# Patient Record
Sex: FEMALE | Race: BLACK OR AFRICAN AMERICAN | Employment: UNEMPLOYED | URBAN - METROPOLITAN AREA
[De-identification: names, ages, dates, MRNs, and addresses within clinical notes are randomized per-mention and may not be internally consistent; named-entity substitution may affect disease eponyms.]

---

## 2017-04-02 ENCOUNTER — HOSPITAL ENCOUNTER (EMERGENCY)
Age: 27
Discharge: STILL ADMITTED OR ADMINISTRATIVE | End: 2017-04-02
Attending: EMERGENCY MEDICINE
Payer: MEDICAID

## 2017-04-02 PROCEDURE — 75810000275 HC EMERGENCY DEPT VISIT NO LEVEL OF CARE

## 2017-04-10 ENCOUNTER — HOSPITAL ENCOUNTER (EMERGENCY)
Age: 27
Discharge: HOME | End: 2017-04-10
Attending: OBSTETRICS & GYNECOLOGY | Admitting: OBSTETRICS & GYNECOLOGY
Payer: MEDICAID

## 2017-04-10 VITALS
BODY MASS INDEX: 22.18 KG/M2 | DIASTOLIC BLOOD PRESSURE: 60 MMHG | TEMPERATURE: 98.5 F | RESPIRATION RATE: 18 BRPM | HEART RATE: 96 BPM | HEIGHT: 66 IN | WEIGHT: 138 LBS | SYSTOLIC BLOOD PRESSURE: 112 MMHG

## 2017-04-10 LAB
SERVICE CMNT-IMP: NORMAL
WET PREP GENITAL: NORMAL

## 2017-04-10 PROCEDURE — 74011250636 HC RX REV CODE- 250/636

## 2017-04-10 PROCEDURE — 59025 FETAL NON-STRESS TEST: CPT

## 2017-04-10 PROCEDURE — 96367 TX/PROPH/DG ADDL SEQ IV INF: CPT

## 2017-04-10 PROCEDURE — 87081 CULTURE SCREEN ONLY: CPT | Performed by: OBSTETRICS & GYNECOLOGY

## 2017-04-10 PROCEDURE — 87210 SMEAR WET MOUNT SALINE/INK: CPT | Performed by: OBSTETRICS & GYNECOLOGY

## 2017-04-10 PROCEDURE — 87491 CHLMYD TRACH DNA AMP PROBE: CPT | Performed by: OBSTETRICS & GYNECOLOGY

## 2017-04-10 PROCEDURE — 77030034850

## 2017-04-10 PROCEDURE — 96360 HYDRATION IV INFUSION INIT: CPT

## 2017-04-10 PROCEDURE — 96365 THER/PROPH/DIAG IV INF INIT: CPT

## 2017-04-10 PROCEDURE — 74011000258 HC RX REV CODE- 258: Performed by: OBSTETRICS & GYNECOLOGY

## 2017-04-10 PROCEDURE — 74011250636 HC RX REV CODE- 250/636: Performed by: OBSTETRICS & GYNECOLOGY

## 2017-04-10 PROCEDURE — 99285 EMERGENCY DEPT VISIT HI MDM: CPT

## 2017-04-10 PROCEDURE — 96372 THER/PROPH/DIAG INJ SC/IM: CPT

## 2017-04-10 RX ORDER — SODIUM CHLORIDE 0.9 % (FLUSH) 0.9 %
5-10 SYRINGE (ML) INJECTION AS NEEDED
Status: CANCELLED | OUTPATIENT
Start: 2017-04-10

## 2017-04-10 RX ORDER — SODIUM CHLORIDE 0.9 % (FLUSH) 0.9 %
5-10 SYRINGE (ML) INJECTION EVERY 8 HOURS
Status: CANCELLED | OUTPATIENT
Start: 2017-04-10

## 2017-04-10 RX ORDER — BETAMETHASONE SODIUM PHOSPHATE AND BETAMETHASONE ACETATE 3; 3 MG/ML; MG/ML
12 INJECTION, SUSPENSION INTRA-ARTICULAR; INTRALESIONAL; INTRAMUSCULAR; SOFT TISSUE EVERY 24 HOURS
Status: DISCONTINUED | OUTPATIENT
Start: 2017-04-10 | End: 2017-04-11 | Stop reason: HOSPADM

## 2017-04-10 RX ORDER — MAGNESIUM SULFATE HEPTAHYDRATE 40 MG/ML
4 INJECTION, SOLUTION INTRAVENOUS ONCE
Status: DISCONTINUED | OUTPATIENT
Start: 2017-04-10 | End: 2017-04-11 | Stop reason: HOSPADM

## 2017-04-10 RX ORDER — MAGNESIUM SULFATE HEPTAHYDRATE 40 MG/ML
INJECTION, SOLUTION INTRAVENOUS
Status: COMPLETED
Start: 2017-04-10 | End: 2017-04-10

## 2017-04-10 RX ORDER — SODIUM CHLORIDE, SODIUM LACTATE, POTASSIUM CHLORIDE, CALCIUM CHLORIDE 600; 310; 30; 20 MG/100ML; MG/100ML; MG/100ML; MG/100ML
125 INJECTION, SOLUTION INTRAVENOUS CONTINUOUS
Status: DISCONTINUED | OUTPATIENT
Start: 2017-04-10 | End: 2017-04-11 | Stop reason: HOSPADM

## 2017-04-10 RX ADMIN — MAGNESIUM SULFATE HEPTAHYDRATE 4 G: 40 INJECTION, SOLUTION INTRAVENOUS at 21:28

## 2017-04-10 RX ADMIN — BETAMETHASONE SODIUM PHOSPHATE AND BETAMETHASONE ACETATE 12 MG: 3; 3 INJECTION, SUSPENSION INTRA-ARTICULAR; INTRALESIONAL; INTRAMUSCULAR at 21:43

## 2017-04-10 RX ADMIN — Medication 2 G/HR: at 21:54

## 2017-04-10 RX ADMIN — SODIUM CHLORIDE 5 MILLION UNITS: 900 INJECTION INTRAVENOUS at 21:43

## 2017-04-11 LAB
C TRACH RRNA SPEC QL NAA+PROBE: NEGATIVE
N GONORRHOEA RRNA SPEC QL NAA+PROBE: NEGATIVE
SPECIMEN SOURCE: NORMAL

## 2017-04-11 NOTE — PROGRESS NOTES
2211:  Patient being picked up by [facility],[address] transport for [facility].  Call placed to L & D charge nurse, Receiving RN will return call. 2238:   Patience Baxter RN from L&D @ [facility] returned call. Report given on patients status, VS, Labs, HX.

## 2017-04-11 NOTE — PROGRESS NOTES
- Dr. Luci Lance at bedside, speculum and SVE, 2 cm, bedside ultrasound, triage assessment performed    - Patient presents to labor and delivery from the emergency room  25weeks 2days with twin gestation, complains of spotting and cramping    - Medics on unit to transfer to [facility]

## 2017-04-11 NOTE — H&P
History & Physical    Name: Emily Stark MRN: [id number]  SSN: [id number]    YOB: 1990  Age: 23 y.o. Sex: female        Subjective:     Estimated Date of Delivery: None noted. OB History      Para Term  AB TAB SAB Ectopic Multiple Living    6 3 1 2 2 1 1  0 3          Ms. Neno Hardin presents for evaluation of pregnancy at 25.2 for vaginal bleeding. Notes that she woke this afternoon and noted bright red bleeding, associated with cramping, notes tightening in her abdomen as well. Bled like the first day of her period. Twin pregnancy, denies any history of previa, was not told she cannot have sex. + FM, no ctxs, pain is more sharp, 8/10, better than previous, pains every 20 minutes, lasts a few seconds. No STIs this pregnancy. Prenatal course was complicated by twins. Please see prenatal records for details. History reviewed. No pertinent past medical history. History reviewed. No pertinent surgical history. No Known Allergies  Prior to Admission medications    Medication Sig Start Date End Date Taking? Authorizing Provider   Aspirin        ibuprofen (MOTRIN) 800 mg tablet           Social History     Occupational History    Not on file. Social History Main Topics    Smoking status: Current Every Day Smoker     Packs/day: 0.25    Smokeless tobacco: Not on file    Alcohol use No    Drug use: No    Sexual activity: Yes     Partners: Male     Family History   Problem Relation Age of Onset    Ovarian Cancer Mother     Other Maternal Grandmother      brain aneurysm    Diabetes Paternal Grandmother     Heart Attack Paternal Grandmother     Diabetes Paternal Grandfather     Heart Attack Paternal Grandfather        Review of Systems: A comprehensive review of systems was negative. Notes frequent HAs, taking advil    Objective:     Vitals: There were no vitals filed for this visit.      Physical Exam:  GENERAL:  Well developed, well nourished, alert, oriented x 3, in no distress  HEENT: Normal cephalic, atraumatic, good dentition, neck supple. No adenopathy or thyromegaly. CV: regular rate and rhythm  LUNGS: clear, good air entry, no wheezes, rhonchi or rales  ABDOMEN: + BS, soft without tenderness  BACK:  No CVA tenderness, no skin lesions  EXTREMITIES: no edema or erythema noted  NEURO: Grossly intact     PELVIC EXAM:  LABIA MAJORA: no masses, tenderness or lesions  LABIA MINORA: no masses, tenderness or lesions  CLITORIS: no masses, tenderness or lesions  VAGINA: pink appearing vagina with physiologic discharge, no lesions, no blood in the vagina   PERINEUM: no masses, tenderness or lesions  CERVIX: 8/-7, cephalic  UTERUS: gravid, nontender  ADNEXA: nontender and no masses    Membranes:  Intact     Fetal Heart Rate:  Baby A Baseline: 155 per minute  Variability: moderate    Baby B Baseline:  125 per minute  Variability:  Moderate    Stamps:  No ctxs    Bedside US:  Cephalic/cephalic  Fundal/anterior placenta    Prenatal Labs:   Lab Results   Component Value Date/Time    GrJAYANTtrep, External negative 2016        No results found for this or any previous visit (from the past 24 hour(s)). Assessment/Plan:     Plan: Twins   IUP at 25.2   Back pain/Abdominal tightening/PTL--> Does not describe overt contractions but she is 2 cm dilated, history of  delivery x 2. Called and discussed with Dr. Amber Reyes who accepts transfer. GC/Chl/Wet prep done. GBS swab done. Start Magnesium sulfate and antibiotics for GBS prophylaxis. Bleeding--> Suspect rectal bleeding, states that this bleeding was associated with a BM.    Reassuring fetal status       Signed By:  Calli العراقي MD     April 10, 2017 8:23 PM

## 2017-04-13 LAB
BACTERIA SPEC CULT: NEGATIVE
SERVICE CMNT-IMP: NORMAL

## 2017-05-18 ENCOUNTER — ANESTHESIA EVENT (OUTPATIENT)
Dept: SURGERY | Age: 27
DRG: 540 | End: 2017-05-18
Payer: MEDICAID

## 2017-05-18 ENCOUNTER — HOSPITAL ENCOUNTER (INPATIENT)
Age: 27
LOS: 1 days | Discharge: LEFT BEFORE DISCHARGE | DRG: 540 | End: 2017-05-18
Attending: OBSTETRICS & GYNECOLOGY | Admitting: OBSTETRICS & GYNECOLOGY
Payer: MEDICAID

## 2017-05-18 ENCOUNTER — ANESTHESIA (OUTPATIENT)
Dept: SURGERY | Age: 27
DRG: 540 | End: 2017-05-18
Payer: MEDICAID

## 2017-05-18 VITALS
OXYGEN SATURATION: 100 % | HEART RATE: 74 BPM | DIASTOLIC BLOOD PRESSURE: 70 MMHG | RESPIRATION RATE: 18 BRPM | SYSTOLIC BLOOD PRESSURE: 109 MMHG | TEMPERATURE: 98.3 F

## 2017-05-18 LAB
ABO + RH BLD: NORMAL
BASOPHILS # BLD AUTO: 0 K/UL (ref 0–0.06)
BASOPHILS # BLD: 0 % (ref 0–3)
BLOOD GROUP ANTIBODIES SERPL: NORMAL
DIFFERENTIAL METHOD BLD: ABNORMAL
EOSINOPHIL # BLD: 0 K/UL (ref 0–0.4)
EOSINOPHIL NFR BLD: 0 % (ref 0–5)
ERYTHROCYTE [DISTWIDTH] IN BLOOD BY AUTOMATED COUNT: 17.6 % (ref 11.6–14.5)
HBV SURFACE AG SER QL: <0.1 INDEX
HBV SURFACE AG SER QL: NEGATIVE
HCT VFR BLD AUTO: 29.9 % (ref 35–45)
HGB BLD-MCNC: 9.6 G/DL (ref 12–16)
LYMPHOCYTES # BLD AUTO: 15 % (ref 20–51)
LYMPHOCYTES # BLD: 3.7 K/UL (ref 0.8–3.5)
MCH RBC QN AUTO: 30.6 PG (ref 24–34)
MCHC RBC AUTO-ENTMCNC: 32.1 G/DL (ref 31–37)
MCV RBC AUTO: 95.2 FL (ref 74–97)
METAMYELOCYTES NFR BLD MANUAL: 1 %
MONOCYTES # BLD: 2.5 K/UL (ref 0–1)
MONOCYTES NFR BLD AUTO: 10 % (ref 2–9)
NEUTS BAND NFR BLD MANUAL: 1 % (ref 0–5)
NEUTS SEG # BLD: 18.3 K/UL (ref 1.8–8)
NEUTS SEG NFR BLD AUTO: 73 % (ref 42–75)
NRBC BLD-RTO: 2 PER 100 WBC
PLATELET # BLD AUTO: 366 K/UL (ref 135–420)
PLATELET COMMENTS,PCOM: ABNORMAL
PMV BLD AUTO: 9.6 FL (ref 9.2–11.8)
RBC # BLD AUTO: 3.14 M/UL (ref 4.2–5.3)
RBC MORPH BLD: ABNORMAL
RPR SER QL: NONREACTIVE
RUBV IGG SER-IMP: NORMAL
SPECIMEN EXP DATE BLD: NORMAL
WBC # BLD AUTO: 24.7 K/UL (ref 4.6–13.2)

## 2017-05-18 PROCEDURE — 74011250636 HC RX REV CODE- 250/636: Performed by: OBSTETRICS & GYNECOLOGY

## 2017-05-18 PROCEDURE — 36415 COLL VENOUS BLD VENIPUNCTURE: CPT | Performed by: OBSTETRICS & GYNECOLOGY

## 2017-05-18 PROCEDURE — 74011250636 HC RX REV CODE- 250/636

## 2017-05-18 PROCEDURE — 76210000017 HC OR PH I REC 1.5 TO 2 HR: Performed by: OBSTETRICS & GYNECOLOGY

## 2017-05-18 PROCEDURE — 77030018809 HC RETRCTR ALXSO DISP AMR -B: Performed by: OBSTETRICS & GYNECOLOGY

## 2017-05-18 PROCEDURE — 86762 RUBELLA ANTIBODY: CPT | Performed by: OBSTETRICS & GYNECOLOGY

## 2017-05-18 PROCEDURE — 77030005538 HC CATH URETH FOL44 BARD -B

## 2017-05-18 PROCEDURE — 74011250637 HC RX REV CODE- 250/637: Performed by: OBSTETRICS & GYNECOLOGY

## 2017-05-18 PROCEDURE — 85025 COMPLETE CBC W/AUTO DIFF WBC: CPT | Performed by: OBSTETRICS & GYNECOLOGY

## 2017-05-18 PROCEDURE — 86900 BLOOD TYPING SEROLOGIC ABO: CPT | Performed by: OBSTETRICS & GYNECOLOGY

## 2017-05-18 PROCEDURE — 77030014144 HC TY SPN ANES BBMI -B: Performed by: ANESTHESIOLOGY

## 2017-05-18 PROCEDURE — 88307 TISSUE EXAM BY PATHOLOGIST: CPT | Performed by: OBSTETRICS & GYNECOLOGY

## 2017-05-18 PROCEDURE — 77030008467 HC STPLR SKN COVD -B: Performed by: OBSTETRICS & GYNECOLOGY

## 2017-05-18 PROCEDURE — 59025 FETAL NON-STRESS TEST: CPT

## 2017-05-18 PROCEDURE — 86592 SYPHILIS TEST NON-TREP QUAL: CPT | Performed by: OBSTETRICS & GYNECOLOGY

## 2017-05-18 PROCEDURE — 77030018846 HC SOL IRR STRL H20 ICUM -A: Performed by: OBSTETRICS & GYNECOLOGY

## 2017-05-18 PROCEDURE — 65270000029 HC RM PRIVATE

## 2017-05-18 PROCEDURE — 77030011265 HC ELECTRD BLD HEX COVD -A: Performed by: OBSTETRICS & GYNECOLOGY

## 2017-05-18 PROCEDURE — 76060000033 HC ANESTHESIA 1 TO 1.5 HR: Performed by: OBSTETRICS & GYNECOLOGY

## 2017-05-18 PROCEDURE — 87340 HEPATITIS B SURFACE AG IA: CPT | Performed by: OBSTETRICS & GYNECOLOGY

## 2017-05-18 PROCEDURE — 77030018836 HC SOL IRR NACL ICUM -A: Performed by: OBSTETRICS & GYNECOLOGY

## 2017-05-18 PROCEDURE — 99282 EMERGENCY DEPT VISIT SF MDM: CPT

## 2017-05-18 PROCEDURE — 74011000250 HC RX REV CODE- 250

## 2017-05-18 PROCEDURE — 74011000250 HC RX REV CODE- 250: Performed by: OBSTETRICS & GYNECOLOGY

## 2017-05-18 PROCEDURE — 74011250636 HC RX REV CODE- 250/636: Performed by: NURSE ANESTHETIST, CERTIFIED REGISTERED

## 2017-05-18 PROCEDURE — 77030020782 HC GWN BAIR PAWS FLX 3M -B: Performed by: OBSTETRICS & GYNECOLOGY

## 2017-05-18 PROCEDURE — 77030002974 HC SUT PLN J&J -A: Performed by: OBSTETRICS & GYNECOLOGY

## 2017-05-18 PROCEDURE — 77010026064 HC OXYGEN INFANT MED AIR MIN

## 2017-05-18 PROCEDURE — 75410000003 HC RECOV DEL/VAG/CSECN EA 0.5 HR

## 2017-05-18 PROCEDURE — 76010000149 HC OR TIME 1 TO 1.5 HR: Performed by: OBSTETRICS & GYNECOLOGY

## 2017-05-18 PROCEDURE — 77030031139 HC SUT VCRL2 J&J -A: Performed by: OBSTETRICS & GYNECOLOGY

## 2017-05-18 PROCEDURE — 10907ZC DRAINAGE OF AMNIOTIC FLUID, THERAPEUTIC FROM PRODUCTS OF CONCEPTION, VIA NATURAL OR ARTIFICIAL OPENING: ICD-10-PCS | Performed by: OBSTETRICS & GYNECOLOGY

## 2017-05-18 RX ORDER — TERBUTALINE SULFATE 1 MG/ML
0.25 INJECTION SUBCUTANEOUS
Status: DISCONTINUED | OUTPATIENT
Start: 2017-05-18 | End: 2017-05-18 | Stop reason: HOSPADM

## 2017-05-18 RX ORDER — CEFAZOLIN SODIUM 2 G/50ML
SOLUTION INTRAVENOUS
Status: COMPLETED
Start: 2017-05-18 | End: 2017-05-18

## 2017-05-18 RX ORDER — BUPIVACAINE HYDROCHLORIDE 7.5 MG/ML
INJECTION, SOLUTION INTRASPINAL AS NEEDED
Status: DISCONTINUED | OUTPATIENT
Start: 2017-05-18 | End: 2017-05-18 | Stop reason: HOSPADM

## 2017-05-18 RX ORDER — IBUPROFEN 400 MG/1
800 TABLET ORAL
Status: DISCONTINUED | OUTPATIENT
Start: 2017-05-21 | End: 2017-05-18

## 2017-05-18 RX ORDER — IBUPROFEN 400 MG/1
800 TABLET ORAL
Status: DISCONTINUED | OUTPATIENT
Start: 2017-05-18 | End: 2017-05-19 | Stop reason: HOSPADM

## 2017-05-18 RX ORDER — TRISODIUM CITRATE DIHYDRATE AND CITRIC ACID MONOHYDRATE 500; 334 MG/5ML; MG/5ML
30 SOLUTION ORAL
Status: COMPLETED | OUTPATIENT
Start: 2017-05-18 | End: 2017-05-18

## 2017-05-18 RX ORDER — SODIUM CHLORIDE, SODIUM LACTATE, POTASSIUM CHLORIDE, CALCIUM CHLORIDE 600; 310; 30; 20 MG/100ML; MG/100ML; MG/100ML; MG/100ML
125 INJECTION, SOLUTION INTRAVENOUS CONTINUOUS
Status: DISCONTINUED | OUTPATIENT
Start: 2017-05-18 | End: 2017-05-18 | Stop reason: HOSPADM

## 2017-05-18 RX ORDER — SODIUM CHLORIDE 0.9 % (FLUSH) 0.9 %
5-10 SYRINGE (ML) INJECTION EVERY 8 HOURS
Status: DISCONTINUED | OUTPATIENT
Start: 2017-05-18 | End: 2017-05-19 | Stop reason: HOSPADM

## 2017-05-18 RX ORDER — ACETAMINOPHEN 325 MG/1
650 TABLET ORAL
Status: DISCONTINUED | OUTPATIENT
Start: 2017-05-18 | End: 2017-05-19 | Stop reason: HOSPADM

## 2017-05-18 RX ORDER — CEFAZOLIN SODIUM 2 G/50ML
2 SOLUTION INTRAVENOUS
Status: ACTIVE | OUTPATIENT
Start: 2017-05-18 | End: 2017-05-18

## 2017-05-18 RX ORDER — BUTORPHANOL TARTRATE 1 MG/ML
2 INJECTION INTRAMUSCULAR; INTRAVENOUS
Status: DISCONTINUED | OUTPATIENT
Start: 2017-05-18 | End: 2017-05-18 | Stop reason: HOSPADM

## 2017-05-18 RX ORDER — PROMETHAZINE HYDROCHLORIDE 25 MG/ML
25 INJECTION, SOLUTION INTRAMUSCULAR; INTRAVENOUS
Status: DISCONTINUED | OUTPATIENT
Start: 2017-05-18 | End: 2017-05-19 | Stop reason: HOSPADM

## 2017-05-18 RX ORDER — ZOLPIDEM TARTRATE 5 MG/1
5 TABLET ORAL
Status: DISCONTINUED | OUTPATIENT
Start: 2017-05-18 | End: 2017-05-19 | Stop reason: HOSPADM

## 2017-05-18 RX ORDER — OXYTOCIN 10 [USP'U]/ML
INJECTION, SOLUTION INTRAMUSCULAR; INTRAVENOUS AS NEEDED
Status: DISCONTINUED | OUTPATIENT
Start: 2017-05-18 | End: 2017-05-18 | Stop reason: HOSPADM

## 2017-05-18 RX ORDER — MORPHINE SULFATE 0.5 MG/ML
INJECTION, SOLUTION EPIDURAL; INTRATHECAL; INTRAVENOUS AS NEEDED
Status: DISCONTINUED | OUTPATIENT
Start: 2017-05-18 | End: 2017-05-18 | Stop reason: HOSPADM

## 2017-05-18 RX ORDER — SIMETHICONE 80 MG
80 TABLET,CHEWABLE ORAL
Status: DISCONTINUED | OUTPATIENT
Start: 2017-05-18 | End: 2017-05-19 | Stop reason: HOSPADM

## 2017-05-18 RX ORDER — NALOXONE HYDROCHLORIDE 0.4 MG/ML
0.2 INJECTION, SOLUTION INTRAMUSCULAR; INTRAVENOUS; SUBCUTANEOUS
Status: DISCONTINUED | OUTPATIENT
Start: 2017-05-18 | End: 2017-05-19 | Stop reason: HOSPADM

## 2017-05-18 RX ORDER — FACIAL-BODY WIPES
10 EACH TOPICAL
Status: DISCONTINUED | OUTPATIENT
Start: 2017-05-18 | End: 2017-05-19 | Stop reason: HOSPADM

## 2017-05-18 RX ORDER — NALBUPHINE HYDROCHLORIDE 10 MG/ML
5 INJECTION, SOLUTION INTRAMUSCULAR; INTRAVENOUS; SUBCUTANEOUS
Status: DISCONTINUED | OUTPATIENT
Start: 2017-05-18 | End: 2017-05-19 | Stop reason: HOSPADM

## 2017-05-18 RX ORDER — DEXAMETHASONE SODIUM PHOSPHATE 4 MG/ML
INJECTION, SOLUTION INTRA-ARTICULAR; INTRALESIONAL; INTRAMUSCULAR; INTRAVENOUS; SOFT TISSUE AS NEEDED
Status: DISCONTINUED | OUTPATIENT
Start: 2017-05-18 | End: 2017-05-18 | Stop reason: HOSPADM

## 2017-05-18 RX ORDER — OXYTOCIN/RINGER'S LACTATE 20/1000 ML
125 PLASTIC BAG, INJECTION (ML) INTRAVENOUS CONTINUOUS
Status: DISCONTINUED | OUTPATIENT
Start: 2017-05-18 | End: 2017-05-19 | Stop reason: HOSPADM

## 2017-05-18 RX ORDER — ONDANSETRON 2 MG/ML
4 INJECTION INTRAMUSCULAR; INTRAVENOUS
Status: DISCONTINUED | OUTPATIENT
Start: 2017-05-18 | End: 2017-05-18 | Stop reason: HOSPADM

## 2017-05-18 RX ORDER — KETOROLAC TROMETHAMINE 30 MG/ML
INJECTION, SOLUTION INTRAMUSCULAR; INTRAVENOUS AS NEEDED
Status: DISCONTINUED | OUTPATIENT
Start: 2017-05-18 | End: 2017-05-18 | Stop reason: HOSPADM

## 2017-05-18 RX ORDER — SODIUM CHLORIDE 0.9 % (FLUSH) 0.9 %
5-10 SYRINGE (ML) INJECTION AS NEEDED
Status: DISCONTINUED | OUTPATIENT
Start: 2017-05-18 | End: 2017-05-19 | Stop reason: HOSPADM

## 2017-05-18 RX ORDER — MISOPROSTOL 200 UG/1
800 TABLET ORAL
Status: DISCONTINUED | OUTPATIENT
Start: 2017-05-18 | End: 2017-05-18 | Stop reason: HOSPADM

## 2017-05-18 RX ORDER — FAMOTIDINE 10 MG/ML
INJECTION INTRAVENOUS
Status: DISPENSED
Start: 2017-05-18 | End: 2017-05-18

## 2017-05-18 RX ORDER — KETOROLAC TROMETHAMINE 30 MG/ML
30 INJECTION, SOLUTION INTRAMUSCULAR; INTRAVENOUS EVERY 6 HOURS
Status: DISCONTINUED | OUTPATIENT
Start: 2017-05-18 | End: 2017-05-18

## 2017-05-18 RX ORDER — OXYTOCIN/RINGER'S LACTATE 20/1000 ML
500 PLASTIC BAG, INJECTION (ML) INTRAVENOUS ONCE
Status: DISCONTINUED | OUTPATIENT
Start: 2017-05-18 | End: 2017-05-18 | Stop reason: HOSPADM

## 2017-05-18 RX ORDER — DIPHENHYDRAMINE HYDROCHLORIDE 50 MG/ML
25 INJECTION, SOLUTION INTRAMUSCULAR; INTRAVENOUS
Status: DISCONTINUED | OUTPATIENT
Start: 2017-05-18 | End: 2017-05-19 | Stop reason: HOSPADM

## 2017-05-18 RX ORDER — FENTANYL CITRATE 50 UG/ML
INJECTION, SOLUTION INTRAMUSCULAR; INTRAVENOUS AS NEEDED
Status: DISCONTINUED | OUTPATIENT
Start: 2017-05-18 | End: 2017-05-18 | Stop reason: HOSPADM

## 2017-05-18 RX ORDER — OXYCODONE AND ACETAMINOPHEN 5; 325 MG/1; MG/1
1-2 TABLET ORAL
Status: DISCONTINUED | OUTPATIENT
Start: 2017-05-18 | End: 2017-05-19 | Stop reason: HOSPADM

## 2017-05-18 RX ORDER — SODIUM CHLORIDE, SODIUM LACTATE, POTASSIUM CHLORIDE, CALCIUM CHLORIDE 600; 310; 30; 20 MG/100ML; MG/100ML; MG/100ML; MG/100ML
125 INJECTION, SOLUTION INTRAVENOUS ONCE
Status: DISPENSED | OUTPATIENT
Start: 2017-05-18 | End: 2017-05-18

## 2017-05-18 RX ORDER — CARBOPROST TROMETHAMINE 250 UG/ML
250 INJECTION, SOLUTION INTRAMUSCULAR
Status: DISCONTINUED | OUTPATIENT
Start: 2017-05-18 | End: 2017-05-18 | Stop reason: HOSPADM

## 2017-05-18 RX ORDER — METHYLERGONOVINE MALEATE 0.2 MG/ML
0.2 INJECTION INTRAVENOUS AS NEEDED
Status: DISCONTINUED | OUTPATIENT
Start: 2017-05-18 | End: 2017-05-18 | Stop reason: HOSPADM

## 2017-05-18 RX ORDER — LIDOCAINE HYDROCHLORIDE 10 MG/ML
20 INJECTION, SOLUTION EPIDURAL; INFILTRATION; INTRACAUDAL; PERINEURAL AS NEEDED
Status: DISCONTINUED | OUTPATIENT
Start: 2017-05-18 | End: 2017-05-18 | Stop reason: HOSPADM

## 2017-05-18 RX ORDER — MINERAL OIL
30 OIL (ML) ORAL AS NEEDED
Status: DISCONTINUED | OUTPATIENT
Start: 2017-05-18 | End: 2017-05-18 | Stop reason: HOSPADM

## 2017-05-18 RX ORDER — SODIUM CHLORIDE, SODIUM LACTATE, POTASSIUM CHLORIDE, CALCIUM CHLORIDE 600; 310; 30; 20 MG/100ML; MG/100ML; MG/100ML; MG/100ML
75 INJECTION, SOLUTION INTRAVENOUS CONTINUOUS
Status: DISCONTINUED | OUTPATIENT
Start: 2017-05-18 | End: 2017-05-19 | Stop reason: HOSPADM

## 2017-05-18 RX ORDER — TRISODIUM CITRATE DIHYDRATE AND CITRIC ACID MONOHYDRATE 500; 334 MG/5ML; MG/5ML
SOLUTION ORAL
Status: DISPENSED
Start: 2017-05-18 | End: 2017-05-18

## 2017-05-18 RX ORDER — ONDANSETRON 2 MG/ML
INJECTION INTRAMUSCULAR; INTRAVENOUS AS NEEDED
Status: DISCONTINUED | OUTPATIENT
Start: 2017-05-18 | End: 2017-05-18 | Stop reason: HOSPADM

## 2017-05-18 RX ORDER — ONDANSETRON 2 MG/ML
4 INJECTION INTRAMUSCULAR; INTRAVENOUS
Status: DISCONTINUED | OUTPATIENT
Start: 2017-05-18 | End: 2017-05-19 | Stop reason: HOSPADM

## 2017-05-18 RX ORDER — KETOROLAC TROMETHAMINE 30 MG/ML
30 INJECTION, SOLUTION INTRAMUSCULAR; INTRAVENOUS
Status: DISCONTINUED | OUTPATIENT
Start: 2017-05-18 | End: 2017-05-18

## 2017-05-18 RX ORDER — OXYTOCIN/RINGER'S LACTATE 20/1000 ML
125 PLASTIC BAG, INJECTION (ML) INTRAVENOUS CONTINUOUS
Status: DISCONTINUED | OUTPATIENT
Start: 2017-05-18 | End: 2017-05-18 | Stop reason: HOSPADM

## 2017-05-18 RX ADMIN — SODIUM CHLORIDE, SODIUM LACTATE, POTASSIUM CHLORIDE, AND CALCIUM CHLORIDE 75 ML/HR: 600; 310; 30; 20 INJECTION, SOLUTION INTRAVENOUS at 13:28

## 2017-05-18 RX ADMIN — ONDANSETRON 4 MG: 2 INJECTION INTRAMUSCULAR; INTRAVENOUS at 04:24

## 2017-05-18 RX ADMIN — SODIUM CHLORIDE, SODIUM LACTATE, POTASSIUM CHLORIDE, AND CALCIUM CHLORIDE 125 ML/HR: 600; 310; 30; 20 INJECTION, SOLUTION INTRAVENOUS at 02:20

## 2017-05-18 RX ADMIN — MORPHINE SULFATE 4.75 MG: 0.5 INJECTION, SOLUTION EPIDURAL; INTRATHECAL; INTRAVENOUS at 04:24

## 2017-05-18 RX ADMIN — FAMOTIDINE 20 MG: 10 INJECTION, SOLUTION INTRAVENOUS at 03:04

## 2017-05-18 RX ADMIN — SODIUM CITRATE AND CITRIC ACID MONOHYDRATE 30 ML: 500; 334 SOLUTION ORAL at 03:08

## 2017-05-18 RX ADMIN — IBUPROFEN 800 MG: 400 TABLET, FILM COATED ORAL at 20:12

## 2017-05-18 RX ADMIN — FENTANYL CITRATE 85 MCG: 50 INJECTION, SOLUTION INTRAMUSCULAR; INTRAVENOUS at 04:24

## 2017-05-18 RX ADMIN — CEFAZOLIN SODIUM 2 G: 2 SOLUTION INTRAVENOUS at 03:29

## 2017-05-18 RX ADMIN — MORPHINE SULFATE 0.25 MG: 0.5 INJECTION, SOLUTION EPIDURAL; INTRATHECAL; INTRAVENOUS at 03:36

## 2017-05-18 RX ADMIN — DEXAMETHASONE SODIUM PHOSPHATE 4 MG: 4 INJECTION, SOLUTION INTRA-ARTICULAR; INTRALESIONAL; INTRAMUSCULAR; INTRAVENOUS; SOFT TISSUE at 04:24

## 2017-05-18 RX ADMIN — OXYTOCIN 20 UNITS: 10 INJECTION, SOLUTION INTRAMUSCULAR; INTRAVENOUS at 05:12

## 2017-05-18 RX ADMIN — OXYTOCIN 20 UNITS: 10 INJECTION, SOLUTION INTRAMUSCULAR; INTRAVENOUS at 03:56

## 2017-05-18 RX ADMIN — KETOROLAC TROMETHAMINE 30 MG: 30 INJECTION INTRAMUSCULAR; INTRAVENOUS at 13:17

## 2017-05-18 RX ADMIN — KETOROLAC TROMETHAMINE 30 MG: 30 INJECTION, SOLUTION INTRAMUSCULAR; INTRAVENOUS at 04:24

## 2017-05-18 RX ADMIN — SODIUM CHLORIDE, SODIUM LACTATE, POTASSIUM CHLORIDE, AND CALCIUM CHLORIDE 125 ML/HR: 600; 310; 30; 20 INJECTION, SOLUTION INTRAVENOUS at 03:13

## 2017-05-18 RX ADMIN — BUPIVACAINE HYDROCHLORIDE 10.5 MG: 7.5 INJECTION, SOLUTION INTRASPINAL at 03:36

## 2017-05-18 RX ADMIN — FENTANYL CITRATE 15 MCG: 50 INJECTION, SOLUTION INTRAMUSCULAR; INTRAVENOUS at 03:36

## 2017-05-18 NOTE — ANESTHESIA PROCEDURE NOTES
Spinal Block    Start time: 5/18/2017 3:36 AM  End time: 5/18/2017 3:40 AM  Performed by: Tana Boateng  Authorized by: Nakul Morales     Pre-procedure:   Indications: at surgeon's request, post-op pain management, procedure for pain and primary anesthetic  Preanesthetic Checklist: patient identified, risks and benefits discussed, anesthesia consent, site marked, patient being monitored and timeout performed      Spinal Block:   Patient Position:  Seated  Prep Region:  Lumbar  Prep: Betadine      Location:  L2-3  Technique:  Single shot  Local:  Lidocaine 1%      Needle:   Needle Type:  Pencan  Needle Gauge:  25 G  Attempts:  1      Events: CSF confirmed, no blood with aspiration and no paresthesia        Assessment:  Insertion:  Uncomplicated  Patient tolerance:  Patient tolerated the procedure well with no immediate complications  Patient with scoliosis

## 2017-05-18 NOTE — PROGRESS NOTES
Bedside and Verbal shift change report given to MIL Butcher RN (oncoming nurse) by SAMIR Han RN (offgoing nurse). Report included the following information SBAR, Kardex and MAR. Patient resting in bed on phone with [facility] to check status of babies. 0800- Liquid tray given to patient     0900- Patient resting in bed, nauseated     1530- Patient resting in bed, denies pain at this time    1745- TRANSFER - OUT REPORT:    Verbal report given to DOUGLAS Hanks(name) on [address]  being transferred to Mother [facility] (unit) for routine progression of care       Report consisted of patients Situation, Background, Assessment and   Recommendations(SBAR). Information from the following report(s) SBAR, Kardex and MAR was reviewed with the receiving nurse. Lines:   Peripheral IV 04/10/17 Right Hand (Active)       Peripheral IV 05/18/17 Right Forearm (Active)   Site Assessment Clean, dry, & intact 5/18/2017  7:30 AM   Phlebitis Assessment 0 5/18/2017  7:30 AM   Infiltration Assessment 0 5/18/2017  7:30 AM   Dressing Status Clean, dry, & intact 5/18/2017  7:30 AM   Dressing Type Transparent 5/18/2017  7:30 AM   Hub Color/Line Status Pink; Infusing 5/18/2017  7:30 AM        Opportunity for questions and clarification was provided.       Patient transported with:   Registered Nurse

## 2017-05-18 NOTE — ANESTHESIA PREPROCEDURE EVALUATION
Anesthetic History   No history of anesthetic complications            Review of Systems / Medical History  Patient summary reviewed and pertinent labs reviewed    Pulmonary  Within defined limits                 Neuro/Psych   Within defined limits           Cardiovascular                  Exercise tolerance: >4 METS  Comments:  30 weeks pregnant in labor with twin pregnancy   GI/Hepatic/Renal                Endo/Other             Other Findings   Comments:   Risk Factors for Postoperative nausea/vomiting:       History of postoperative nausea/vomiting? NO       Female? YES       Motion sickness? NO       Intended opioid administration for postoperative analgesia? NO      Smoking Abstinence  Current Smoker? YES  Elective Surgery? NO  Seen preoperatively by anesthesiologist or proxy prior to day of surgery? YES  Pt abstained from smoking 24 hours prior to anesthesia?  NO         Physical Exam    Airway  Mallampati: I  TM Distance: 4 - 6 cm  Neck ROM: normal range of motion   Mouth opening: Normal     Cardiovascular    Rhythm: regular  Rate: normal         Dental  No notable dental hx       Pulmonary  Breath sounds clear to auscultation               Abdominal  GI exam deferred       Other Findings            Anesthetic Plan    ASA: 2, emergent  Anesthesia type: spinal            Anesthetic plan and risks discussed with: Patient

## 2017-05-18 NOTE — PROGRESS NOTES
0228: Lab called. 0236: Dr. Jong Orellana at bedside. 7159: C-section called by Dr. Jong Orellana due to breech presentation of baby A.    0954: Lab at bedside.     0240: SVE done by Dr. Jong Orellana 6 cm

## 2017-05-18 NOTE — H&P
History & Physical    Name: Alex Smith MRN: [id number]  SSN: [id number]    YOB: 1990  Age: 23 y.o. Sex: female        Subjective:     Estimated Date of Delivery: 17  OB History      Para Term  AB TAB SAB Ectopic Multiple Living    6 3 1 2 2 1 1  0 3          Ms. Hira Sparrow is admitted with pregnancy at 30w5d for active labor. Notes that her contractions started last night, stronger and more frequent now. Also notes LOF in her leggings. Prenatal course was complicated by  labor with this pregnancy, previous  delivery, twins this pregnancy. Notes that she was hospitalized with [facility] over the past week for PTL, stabilized at 4 cm and was D/C'ed 4 days ago. Please see prenatal records for details that have been requested. No past medical history on file. No past surgical history on file. No Known Allergies  Prior to Admission medications    Medication Sig Start Date End Date Taking? Authorizing Provider   PNV AG.82/CKLYNMU FUM/FOLIC AC (PRENATAL PO) Take  by mouth. Yes Historical Provider      Social History     Occupational History    Not on file. Social History Main Topics    Smoking status: Current Every Day Smoker     Packs/day: 0.25    Smokeless tobacco: Not on file    Alcohol use No    Drug use: No    Sexual activity: Yes     Partners: Male     Family History   Problem Relation Age of Onset    Ovarian Cancer Mother     Other Maternal Grandmother      brain aneurysm    Diabetes Paternal Grandmother     Heart Attack Paternal Grandmother     Diabetes Paternal Grandfather     Heart Attack Paternal Grandfather            Review of Systems: Pertinent items are noted in HPI. Objective:     Vitals:  112/68 101    Physical Exam:  Patient without distress.   Heart: Regular rate and rhythm  Lung: clear to auscultation throughout lung fields, no wheezes, no rales, no rhonchi and normal respiratory effort  Abdomen: soft, nontender  Fundus: soft and non tender  Perineum: blood absent, amniotic fluid absent  Cervical Exam: 7 cm dilated    80% effaced    -2 station    Presenting Part: cephalic  Lower Extremities:  - Edema No  Membranes:  Intact  Fetal Heart Rate: Baby A Baseline: 145 per minute  Variability: moderate  Accelerations: yes  Decelerations: none    Baby B Baseline: 145 per minute  Variability: moderate  Accelerations: yes  Decelerations: none    Uterine contractions: regular, every 1-3 minutes    US vtx/breech    Prenatal Labs:           Assessment/Plan:     Plan: Admit for  labor. Reassuring fetal status proceed with  Section Reassuring fetal status with labor progressing normally. Recommended proceeding with  delivery. R/B/A of vaginal delivery or C/S discussed including infection, bleeding, blood transfusion, injury to internal organs, baby, life saving hysterectomy, future rupture with  if C/S. Reviewed that because her babies are vtx/breech, would recommend C/S. All of her questions answered.       Signed By:  Jamal Echols MD     May 18, 2017 2:47 AM

## 2017-05-18 NOTE — ANESTHESIA POSTPROCEDURE EVALUATION
Post-Anesthesia Evaluation and Assessment    Patient: Rylee South MRN: [id number]  SSN: [id number]    YOB: 1990  Age: 23 y.o. Sex: female       Cardiovascular Function/Vital Signs  Visit Vitals    /72    Pulse 69    Temp 36.8 °C (98.2 °F)    Resp 15    SpO2 99%       Patient is status post spinal anesthesia for * No procedures listed *. Nausea/Vomiting: None    Postoperative hydration reviewed and adequate. Pain:      Managed    Neurological Status:       Block resolving    Mental Status and Level of Consciousness: Alert and oriented     Pulmonary Status:   O2 Device: Room air (05/18/17 0522)   Adequate oxygenation and airway patent    Complications related to anesthesia: None    Post-anesthesia assessment completed.  No concerns    Signed By: Mayito Cordero CRNA     May 18, 2017

## 2017-05-18 NOTE — OP NOTES
PREOPERATIVE DIAGNOSES  1. Intrauterine pregnancy at 30.5. 2.  labor with advanced dilation  3. History of  labor x 2    POSTOPERATIVE DIAGNOSES  1. Intrauterine pregnancy at 30.5. 2.  labor with advanced dilation  3. History of  labor x 2    PROCEDURE PERFORMED: Primary low-transverse  section of a viable Female  infants. SURGEON: Yashira Ferraro MD    ASSIST:  Angelika Esqueda    ANESTHESIA:  Spinal with duramorph    ESTIMATED BLOOD LOSS: 500 mL. SPECIMEN: placenta    FINDINGS: A viable Female infant delivered from the vertex presentation, Apgar's P  and a weight of P. A viable Female infant delivered from the breech presentation, Apgar's P  and a weight of P. There was clear amniotic fluid. COMPLICATIONS: None    DESCRIPTION OF PROCEDURE:    After  consent was obtained, the patient was brought to the operating room and correctly identified. The patient received 2 g of intravenous Ancef on call for the operating room. The patient's epidural was redosed by anesthesia. The patient was placed in the supine position with a roll under her right hip. Sequential compression boots were placed on her legs. Fetal heart tones were confirmed x 2. The patient was prepped and draped in the usual sterile fashion and her anesthesia noted to be adequate. A time out was performed. Using the scalpel, a Pfannenstiel skin incision was made approximately 2 fingerbreadths above the symphysis pubis. This incision was carried down to the level of the fascia with the Bovie knife. The fascia was nicked in the midline and extended transversely with the Bonner scissors. The superior and inferior rectus fascia was sequentially grasped with Kocher clamps and the underlying rectus muscles were bluntly dissected off. The median raphe was  with Bonner scissors. The rectus muscles were divided and the peritoneum was entered bluntly.  The peritoneal defect was made larger with sharp and blunt dissection. The bladder blade was placed and a bladder flap was created with Metzenbaum scissors and blunt dissection. The Francisco J retractor was placed. A low, transverse uterine incision was made and extended with cephalocaudad pull. A viable Female was delivered from the vertex presentation. After this baby was born, the amniotic sac with the baby's feet presented. The sac was ruptured and the infant was delivered uneventfully. The infants were suctioned on the operative field and delayed cord clamping was employed. The babies were dried with with blue towels and laparotomy towels. After approx 1 minute for each baby, the cord was doubly clamped and cut. The infants were given to the nursery staff. The placenta was delivered via external fundal massage and appeared monochorionic. Intravenous Pitocin was administered. The uterus was cleared of all clots and debris. The uterus was closed in a running fashion with 0 Monocryl. A second layer of imbricating sutures was placed using 0 Monocryl. Mild oozing from the middle of the incision was made hemostatic with 2 mattress sutures. The Aroldo Lax was removed. The pelvic gutters were irrigated. The rectus muscles were loosely reapproximated with a figure of eight and interrupted suture of #1 chromic. The fascia was closed in a running fashion with 0 Vicryl. The muscle on the right side was incised and incorporated in the suture which caused a 3 cm lump under the skin. The stitch was removed and the cut muscle was identified and reapproximated with chromic. The fascia was closed in a running fashion with 0 Vicryl. The skin was closed in a subcuticular fashion with 4-0 Monocryl. The incision was cleaned and a pressure dressing was applied. At the end of the procedure, all sharps, sponge, and instrument counts were correct, and the Crawford was draining clear yellow urine.  The patient tolerated the procedure well and she was taken to the recovery room in stable condition.     Chapincito Bennett MD  5/18/2017  4:42 AM

## 2017-05-19 NOTE — PROGRESS NOTES
Patient requested to leave the hospital against medical advice, the attending physician,and the Nursing Supervisor have been contacted. An attempt has been made to enlist the patients cooperation. An attempt was made to contact a family member or surrogate decision maker  to enlist their assistance in the situation but I was  unable to do so. The Physician  informed the patient of the risks associated with her departure from the recommended course of therapy and the patient signed the  Informed Refusal Form. 2200-Patient left the unit in stable condition.

## 2017-05-19 NOTE — ADT AUTH CERT NOTES
Patient Demographics        Patient Name [address] Sex  Address Phone        Emilia [phone] Female 1990 [address]  [address] [address] [phone] (Home)  [phone] (Mobile)           CSN:       [id number]           Admit Date: Admit Time Room Bed       May 18, 2017  2:07 AM 2213 [[id number]] 01 [[id number]]           Attending Providers        Provider Pager From To       Dell Fernandez MD  17      ADM  BOTH TWINS WENT TO [facility]     TWIN A  Delivery Date: 2017   Delivery Time: 3:52 AM   Delivery Type: , Low Transverse  Sex: female   Gestational Age: 30w7d      Measurements:  Birth Weight: 1.42 kg    Birth Length: 15.75\"          Delivery Clinician: [place]?:   Delivery Location: OR      TWIN B  Delivery Date: 2017   Delivery Time: 3:53 AM   Delivery Type: , Low Transverse  Sex: female   Gestational Age: 30w7d      Measurements:  Birth Weight: 1.495 kg    Birth Length: 16.73\"          Delivery Clinician: [place]?:   Delivery Location: OR

## 2017-05-23 NOTE — DISCHARGE SUMMARY
Obstetrical Discharge Summary     Name: Thompson Martinez MRN: [id number]  SSN: [id number]    YOB: 1990  Age: 23 y.o. Sex: female      Admit Date: 2017    Leaving hospital Date: 2017    Admitting Physician: Adan Peraza MD     Attending Physician:  No att. providers found     Discharge Diagnoses:   Information for the patient's :  Carmine Campbell [[id number]]   Delivery of a 3 lb 2.1 oz (1.42 kg) female infant via , Low Transverse on 2017 at 3:52 AM  by . Apgars were  and . Information for the patient's :  Carmine Campbell [[id number]]   Delivery of a 3 lb 4.7 oz (1.495 kg) female infant via , Low Transverse on 2017 at 3:53 AM  by . Apgars were  and . Additional Diagnoses:   Problem List as of 2017  Date Reviewed: 7/15/2016          Codes Class Noted - Resolved    Pregnancy ICD-10-CM: Z33.1  ICD-9-CM: V22.2  2016 - Present              Lab Results   Component Value Date/Time    GrBStrep, External negative 2016     No results for input(s): HGB, HGBEXT in the last 72 hours. Hospital Course: Postpartum course was complicated by poor social situation and the pt left the hospital the day of her cesaran section AMA to care for her other children. Patient Instructions:   Cannot display discharge medications since this patient is not currently admitted. No orders of the defined types were placed in this encounter.        Signed By:  Adan Peraza MD     May 23, 2017 11:24 AM                      BST

## 2017-08-13 NOTE — PROGRESS NOTES
Bacterial Gastroenteritis (Adult)    You have gastroenteritis. It is an infection in your intestinal tract caused by bacteria (dysentery). Viruses, parasites, and toxins may also cause gastroenteritis. This infection may cause fever, vomiting, stomach cramping, and diarrhea. You may have blood or mucus in your stool. Some of the more common causes of bacterial gastroenteritis include E. coli, salmonella, shigella, campylobacter, and clostridium difficile.  Antibiotics are often used to treat this type of infection. Sometimes you may need to wait until a stool culture is done before your healthcare provider gives you an antibiotic. In the meantime, follow the advice below. Do not take antibiotics without your provider's recommendation. This can lead to other complications in certain types of bacterial gastroenteritis.  Home care  Medications  · If your healthcare provider prescribed antibiotics, be sure you take them until they are finished.  · You may use acetaminophen or NSAID medicines like ibuprofen or naproxen to control fever unless another medicine was prescribed. If you have chronic liver or kidney disease, talk with your provider before using these medicines. Also talk with your provider if you've had a stomach ulcer or gastrointestinal bleeding. Don't give aspirin to anyone under 18 years of age who is ill with a fever. It may cause severe liver damage. Don't use NSAID medicines if you are already taking one for another condition (like arthritis) or are on aspirin such as for heart disease or after a stroke.  · Don't take or give over-the-counter antidiarrhea medicines, unless your healthcare provider prescribed them. Antidiarrhea medicine may make your symptoms last longer if the cause is an infectious diarrhea.  · You may be given medicine for nausea and vomiting to help you keep down fluids. Take these medicines as prescribed.  · Gastroenteritis is transmitted by contact with the stool or vomit of an  0210: Rcd  30w5d pt to room 2225 for complaints of contractions and LOF which began after arrival to [address] [place] [address]. Pt reports history of 2 prior  births at 26 weeks. This preganncy has been complicated by  birth. Was admitted last week at [facility] for  labor. Last SVE cervix was 4cm. Pt reports BMZ therapy. EFM and TOCO applied, abd soft between ctx. SVE .-2. Nitrazine negative    0216: Dr Michelle Cleverly called regarding patients arrival, complaints, history- incluidng prior hx of  labor, SVE, and ctx pattern. Dr Evans Valdez will come to hospital. Continuously adjusted EFM to ensure no coincidence. 0230: Dr Michelle Cleverly at bedside for [address]. Twin a vertex, twin B breech, Discussed ricks/benefits of  with patient. SVE done by her. 7cm. Dicussed difficulty differentiating twin A and B on FHM. US shows FHR WNL on both fetuses. 8589: Pt to main OR     0430: Assumed care of patient in OR from STEVEN Downey RN    3505: Pt back in room from [facility]: [address] given. Tolerated well.     0715: Bedside and Verbal shift change report given to MIL Butcher RN (oncoming nurse) by SAMIR Han RN (offgoing nurse). Report included the following information SBAR, Intake/Output and MAR.     0757:  charting done under user [place]  due to computer issues. infected person. This can occur directly from person to person or indirectly from contact with a contaminated surface.  General care  · If symptoms are severe, rest at home for the next 24 hours, or until you feel better.  · Washing your hands with soap and water and using alcohol-bases  is the best way to stop the spread of infection. Wash your hands after touching anyone who is sick.  · Wash your hands after using the toilet and before meals. Clean the toilet after each use.  · Avoid tobacco, caffeine, and alcohol. These can make your symptoms worse.  Diet  Liquids are the first step:  · Water and clear liquids are important so you don't get dehydrated. Drink a small amount at a time or suck on ice chips.  Food  · People with diarrhea should not make or serve food for others. When making food for yourself, wash your hands before and after.  · Wash your hands after using cutting boards, countertops, and knives that have touched raw food.  · Keep uncooked meats away from cooked and ready-to-eat foods.  During the first 24 hours, follow the diet below:  · Beverages: sport drinks, soft drinks without caffeine, mineral water (plain or flavored), decaffeinated tea and coffee. If you are very dehydrated, sports drinks are not a good choice. They have too much sugar and not enough electrolytes. In this case, commercially available products called oral rehydration solutions, are best.  · Soups: clear broth, consommé, and bouillon  · Desserts: plain gelatin, popsicles, and fruit juice bars  During the next 24 hours (the second day), you may add these foods to the above list if you are feeling better. If not, continue what you did the first day:  · Hot cereal, plain toast, bread, rolls, crackers  · Plain noodles, rice, mashed potatoes, chicken noodle or rice soup  · Unsweetened canned fruit (avoid pineapple), bananas  · Limit fat to less than 15 grams per day. Avoid margarine, butter, oils, mayonnaise, sauces,  gravies, fried foods, peanut butter, meat, poultry, and fish.  · Limit fiber. Avoid raw or cooked vegetables, fresh fruits (except bananas), and bran cereals.  · Limit dairy  · Continue to avoid alcohol  · Limit caffeine and chocolate. No spices or seasonings except salt.  During the next 24 hours:  · Gradually resume a normal diet, as you feel better and your symptoms improve.  · If at any time you start feeling worse again, go back to clear liquids until you feel better.  Follow-up care  Follow up with your healthcare provider, or as advised. If you don't get better in 24 hours, or if your diarrhea lasts more than 1 week. Also follow up if you are unable to keep liquids down and stay hydrated.  If a stool (diarrhea) sample was taken, you can call for the results as directed.  Call 911  Call 911 if any of these occur:  · Trouble breathing  · Confused  · Very drowsy or trouble awakening  · Fainting or loss of consciousness  · Rapid heart rate  · Chest pain  · Seizure  · Stiff neck  When to seek medical advice  Call your healthcare provider right away if any of these occur:  · Increasing abdominal pain or constant lower right abdominal pain  · Continued vomiting (unable to keep liquids down)  · Diarrhea for more than 2 days in adults and 24 hours in children  · Stools containing blood or pus or black tarry stools  · Dark urine, reduced urine output  · Weakness, dizziness  · Drowsiness  · Fever of 100.4°F (38.0°C), oral, or higher; or not better with fever medicine  · New rash  · If you are experiencing muscle weakness or arthritis symptoms during or after your gastroenteritis is gone  Date Last Reviewed: 1/3/2016  © 0589-2629 [employer]. [address] [place] [address], [place] [address]. All rights reserved. This information is not intended as a substitute for professional medical care. Always follow your healthcare professional's instructions.

## 2017-09-10 ENCOUNTER — APPOINTMENT (OUTPATIENT)
Dept: GENERAL RADIOLOGY | Age: 27
End: 2017-09-10
Attending: EMERGENCY MEDICINE
Payer: MEDICAID

## 2017-09-10 ENCOUNTER — APPOINTMENT (OUTPATIENT)
Dept: CT IMAGING | Age: 27
End: 2017-09-10
Attending: EMERGENCY MEDICINE
Payer: MEDICAID

## 2017-09-10 ENCOUNTER — HOSPITAL ENCOUNTER (EMERGENCY)
Age: 27
Discharge: HOME | End: 2017-09-10
Attending: EMERGENCY MEDICINE | Admitting: EMERGENCY MEDICINE
Payer: MEDICAID

## 2017-09-10 ENCOUNTER — HOSPITAL ENCOUNTER (EMERGENCY)
Age: 27
Discharge: LEFT BEFORE DISCHARGE | End: 2017-09-10
Attending: EMERGENCY MEDICINE
Payer: MEDICAID

## 2017-09-10 VITALS
SYSTOLIC BLOOD PRESSURE: 121 MMHG | DIASTOLIC BLOOD PRESSURE: 80 MMHG | HEART RATE: 74 BPM | HEIGHT: 67 IN | TEMPERATURE: 98.4 F | BODY MASS INDEX: 21.66 KG/M2 | WEIGHT: 138 LBS | OXYGEN SATURATION: 100 % | RESPIRATION RATE: 16 BRPM

## 2017-09-10 VITALS
WEIGHT: 134 LBS | RESPIRATION RATE: 14 BRPM | BODY MASS INDEX: 21.03 KG/M2 | SYSTOLIC BLOOD PRESSURE: 146 MMHG | HEIGHT: 67 IN | OXYGEN SATURATION: 100 % | TEMPERATURE: 98.7 F | HEART RATE: 78 BPM | DIASTOLIC BLOOD PRESSURE: 92 MMHG

## 2017-09-10 DIAGNOSIS — B96.89 BV (BACTERIAL VAGINOSIS): ICD-10-CM

## 2017-09-10 DIAGNOSIS — N76.0 BV (BACTERIAL VAGINOSIS): ICD-10-CM

## 2017-09-10 DIAGNOSIS — N12 PYELONEPHRITIS: Primary | ICD-10-CM

## 2017-09-10 DIAGNOSIS — Z53.21 PATIENT LEFT WITHOUT BEING SEEN: Primary | ICD-10-CM

## 2017-09-10 DIAGNOSIS — R50.9 FEVER, UNSPECIFIED FEVER CAUSE: ICD-10-CM

## 2017-09-10 LAB
ALBUMIN SERPL-MCNC: 3.4 G/DL (ref 3.4–5)
ALBUMIN/GLOB SERPL: 0.9 {RATIO} (ref 0.8–1.7)
ALP SERPL-CCNC: 56 U/L (ref 45–117)
ALT SERPL-CCNC: 15 U/L (ref 13–56)
ANION GAP SERPL CALC-SCNC: 6 MMOL/L (ref 3–18)
APPEARANCE UR: ABNORMAL
AST SERPL-CCNC: 10 U/L (ref 15–37)
BACTERIA URNS QL MICRO: ABNORMAL /HPF
BASOPHILS # BLD: 0 K/UL (ref 0–0.1)
BASOPHILS NFR BLD: 0 % (ref 0–2)
BILIRUB SERPL-MCNC: 1.3 MG/DL (ref 0.2–1)
BILIRUB UR QL: NEGATIVE
BUN SERPL-MCNC: 6 MG/DL (ref 7–18)
BUN/CREAT SERPL: 6 (ref 12–20)
CALCIUM SERPL-MCNC: 8.5 MG/DL (ref 8.5–10.1)
CHLORIDE SERPL-SCNC: 106 MMOL/L (ref 100–108)
CO2 SERPL-SCNC: 27 MMOL/L (ref 21–32)
COLOR UR: YELLOW
CREAT SERPL-MCNC: 0.97 MG/DL (ref 0.6–1.3)
DIFFERENTIAL METHOD BLD: ABNORMAL
EOSINOPHIL # BLD: 0.1 K/UL (ref 0–0.4)
EOSINOPHIL NFR BLD: 0 % (ref 0–5)
EPITH CASTS URNS QL MICRO: ABNORMAL /LPF (ref 0–5)
ERYTHROCYTE [DISTWIDTH] IN BLOOD BY AUTOMATED COUNT: 16.7 % (ref 11.6–14.5)
GLOBULIN SER CALC-MCNC: 3.7 G/DL (ref 2–4)
GLUCOSE SERPL-MCNC: 98 MG/DL (ref 74–99)
GLUCOSE UR STRIP.AUTO-MCNC: NEGATIVE MG/DL
HCG SERPL QL: NEGATIVE
HCT VFR BLD AUTO: 35.1 % (ref 35–45)
HGB BLD-MCNC: 11.3 G/DL (ref 12–16)
HGB UR QL STRIP: ABNORMAL
KETONES UR QL STRIP.AUTO: NEGATIVE MG/DL
LACTATE BLD-SCNC: 0.8 MMOL/L (ref 0.4–2)
LEUKOCYTE ESTERASE UR QL STRIP.AUTO: ABNORMAL
LYMPHOCYTES # BLD: 1.3 K/UL (ref 0.9–3.6)
LYMPHOCYTES NFR BLD: 10 % (ref 21–52)
MCH RBC QN AUTO: 28.3 PG (ref 24–34)
MCHC RBC AUTO-ENTMCNC: 32.2 G/DL (ref 31–37)
MCV RBC AUTO: 88 FL (ref 74–97)
MONOCYTES # BLD: 1.4 K/UL (ref 0.05–1.2)
MONOCYTES NFR BLD: 11 % (ref 3–10)
NEUTS SEG # BLD: 10.7 K/UL (ref 1.8–8)
NEUTS SEG NFR BLD: 79 % (ref 40–73)
NITRITE UR QL STRIP.AUTO: POSITIVE
PH UR STRIP: 8 [PH] (ref 5–8)
PLATELET # BLD AUTO: 409 K/UL (ref 135–420)
PMV BLD AUTO: 9.3 FL (ref 9.2–11.8)
POTASSIUM SERPL-SCNC: 3.2 MMOL/L (ref 3.5–5.5)
PROT SERPL-MCNC: 7.1 G/DL (ref 6.4–8.2)
PROT UR STRIP-MCNC: 30 MG/DL
RBC # BLD AUTO: 3.99 M/UL (ref 4.2–5.3)
RBC #/AREA URNS HPF: ABNORMAL /HPF (ref 0–5)
SERVICE CMNT-IMP: NORMAL
SODIUM SERPL-SCNC: 139 MMOL/L (ref 136–145)
SP GR UR REFRACTOMETRY: 1.02 (ref 1–1.03)
UROBILINOGEN UR QL STRIP.AUTO: 1 EU/DL (ref 0.2–1)
WBC # BLD AUTO: 13.5 K/UL (ref 4.6–13.2)
WBC URNS QL MICRO: ABNORMAL /HPF (ref 0–4)
WET PREP GENITAL: NORMAL

## 2017-09-10 PROCEDURE — 83605 ASSAY OF LACTIC ACID: CPT

## 2017-09-10 PROCEDURE — 87086 URINE CULTURE/COLONY COUNT: CPT | Performed by: EMERGENCY MEDICINE

## 2017-09-10 PROCEDURE — 87210 SMEAR WET MOUNT SALINE/INK: CPT | Performed by: EMERGENCY MEDICINE

## 2017-09-10 PROCEDURE — 71010 XR CHEST SNGL V: CPT

## 2017-09-10 PROCEDURE — 84703 CHORIONIC GONADOTROPIN ASSAY: CPT | Performed by: EMERGENCY MEDICINE

## 2017-09-10 PROCEDURE — 74177 CT ABD & PELVIS W/CONTRAST: CPT

## 2017-09-10 PROCEDURE — 74011000258 HC RX REV CODE- 258: Performed by: EMERGENCY MEDICINE

## 2017-09-10 PROCEDURE — 81001 URINALYSIS AUTO W/SCOPE: CPT | Performed by: EMERGENCY MEDICINE

## 2017-09-10 PROCEDURE — 80053 COMPREHEN METABOLIC PANEL: CPT | Performed by: EMERGENCY MEDICINE

## 2017-09-10 PROCEDURE — 87186 SC STD MICRODIL/AGAR DIL: CPT | Performed by: EMERGENCY MEDICINE

## 2017-09-10 PROCEDURE — 74011636320 HC RX REV CODE- 636/320: Performed by: EMERGENCY MEDICINE

## 2017-09-10 PROCEDURE — 74011250636 HC RX REV CODE- 250/636: Performed by: EMERGENCY MEDICINE

## 2017-09-10 PROCEDURE — 75810000275 HC EMERGENCY DEPT VISIT NO LEVEL OF CARE

## 2017-09-10 PROCEDURE — 87077 CULTURE AEROBIC IDENTIFY: CPT | Performed by: EMERGENCY MEDICINE

## 2017-09-10 PROCEDURE — 87491 CHLMYD TRACH DNA AMP PROBE: CPT | Performed by: EMERGENCY MEDICINE

## 2017-09-10 PROCEDURE — 99284 EMERGENCY DEPT VISIT MOD MDM: CPT

## 2017-09-10 PROCEDURE — 93005 ELECTROCARDIOGRAM TRACING: CPT

## 2017-09-10 PROCEDURE — 74011250637 HC RX REV CODE- 250/637: Performed by: EMERGENCY MEDICINE

## 2017-09-10 PROCEDURE — 96365 THER/PROPH/DIAG IV INF INIT: CPT

## 2017-09-10 PROCEDURE — 85025 COMPLETE CBC W/AUTO DIFF WBC: CPT | Performed by: EMERGENCY MEDICINE

## 2017-09-10 PROCEDURE — 87040 BLOOD CULTURE FOR BACTERIA: CPT | Performed by: EMERGENCY MEDICINE

## 2017-09-10 PROCEDURE — 96361 HYDRATE IV INFUSION ADD-ON: CPT

## 2017-09-10 RX ORDER — SODIUM CHLORIDE 0.9 % (FLUSH) 0.9 %
5-10 SYRINGE (ML) INJECTION AS NEEDED
Status: DISCONTINUED | OUTPATIENT
Start: 2017-09-10 | End: 2017-09-11 | Stop reason: HOSPADM

## 2017-09-10 RX ORDER — IBUPROFEN 800 MG/1
800 TABLET ORAL EVERY 8 HOURS
Qty: 15 TAB | Refills: 0 | Status: SHIPPED | OUTPATIENT
Start: 2017-09-10 | End: 2017-09-15

## 2017-09-10 RX ORDER — SULFAMETHOXAZOLE AND TRIMETHOPRIM 800; 160 MG/1; MG/1
1 TABLET ORAL 2 TIMES DAILY
Qty: 14 TAB | Refills: 0 | Status: SHIPPED | OUTPATIENT
Start: 2017-09-10 | End: 2017-09-17

## 2017-09-10 RX ORDER — ACETAMINOPHEN 500 MG
1000 TABLET ORAL
Status: COMPLETED | OUTPATIENT
Start: 2017-09-10 | End: 2017-09-10

## 2017-09-10 RX ORDER — METRONIDAZOLE 500 MG/1
500 TABLET ORAL 2 TIMES DAILY
Qty: 14 TAB | Refills: 0 | Status: SHIPPED | OUTPATIENT
Start: 2017-09-10 | End: 2017-09-17

## 2017-09-10 RX ADMIN — PIPERACILLIN SODIUM,TAZOBACTAM SODIUM 3.38 G: 3; .375 INJECTION, POWDER, FOR SOLUTION INTRAVENOUS at 19:18

## 2017-09-10 RX ADMIN — IOPAMIDOL 80 ML: 612 INJECTION, SOLUTION INTRAVENOUS at 19:36

## 2017-09-10 RX ADMIN — SODIUM CHLORIDE 1878 ML: 900 INJECTION, SOLUTION INTRAVENOUS at 18:45

## 2017-09-10 RX ADMIN — ACETAMINOPHEN 1000 MG: 500 TABLET ORAL at 19:17

## 2017-09-10 NOTE — ED NOTES
DANIEL Lion at bedside assessing pt  IV access established 20gauge, left forearm  Labs and 1 set of blood cultures completed

## 2017-09-10 NOTE — ED TRIAGE NOTES
Pt stated she has been felling SOB times 1 day and chills times 2 days, no s/s of acute cardiac or respiratory distress, pt ambulatory strong steady gait

## 2017-09-10 NOTE — ED PROVIDER NOTES
HPI Comments: Left without being seen  Ozzy Worthy MD         History reviewed. No pertinent past medical history. History reviewed. No pertinent surgical history. Family History:   Problem Relation Age of Onset    Ovarian Cancer Mother     Other Maternal Grandmother      brain aneurysm    Diabetes Paternal Grandmother     Heart Attack Paternal Grandmother     Diabetes Paternal Grandfather     Heart Attack Paternal Grandfather        Social History     Social History    Marital status: SINGLE     Spouse name: N/A    Number of children: N/A    Years of education: N/A     Occupational History    Not on file. Social History Main Topics    Smoking status: Current Every Day Smoker     Packs/day: 0.25    Smokeless tobacco: Never Used    Alcohol use No    Drug use: No    Sexual activity: Yes     Partners: Male, Female     Other Topics Concern    Not on file     Social History Narrative         ALLERGIES: Review of patient's allergies indicates no known allergies.     Review of Systems    Vitals:    09/10/17 0014   BP: (!) 146/92   Pulse: 78   Resp: 14   Temp: 98.7 °F (37.1 °C)   SpO2: 100%   Weight: 60.8 kg (134 lb)   Height: 5' 7\" (1.702 m)            Physical Exam     MDM  Number of Diagnoses or Management Options  Patient left without being seen:     ED Course       Procedures

## 2017-09-10 NOTE — ED PROVIDER NOTES
Patient is a 23 y.o. female presenting with cough. The history is provided by the patient. Cough   This is a new problem. Associated symptoms include chills, myalgias and nausea. Pertinent negatives include no chest pain, no shortness of breath, no wheezing and no vomiting. Pt presents with c/o abd pain, myalgias, and chills since yesterday. Dry cough today. Little appetite; +nausea. Denies vomiting, dysuria, diarrhea, vaginal discharge, rash. H/o  but no other abd surgeries. No flatus or stools today but hasn't eaten. No meds taken pta for relief. Denies ETOH, tobacco, illicits. History reviewed. No pertinent past medical history. History reviewed. No pertinent surgical history. Family History:   Problem Relation Age of Onset    Ovarian Cancer Mother     Other Maternal Grandmother      brain aneurysm    Diabetes Paternal Grandmother     Heart Attack Paternal Grandmother     Diabetes Paternal Grandfather     Heart Attack Paternal Grandfather        Social History     Social History    Marital status: SINGLE     Spouse name: N/A    Number of children: N/A    Years of education: N/A     Occupational History    Not on file. Social History Main Topics    Smoking status: Current Every Day Smoker     Packs/day: 0.25    Smokeless tobacco: Never Used    Alcohol use No    Drug use: No    Sexual activity: Yes     Partners: Male, Female     Other Topics Concern    Not on file     Social History Narrative         ALLERGIES: Review of patient's allergies indicates no known allergies. Review of Systems   Constitutional: Positive for chills and fever. HENT: Negative. Eyes: Negative. Respiratory: Positive for cough. Negative for shortness of breath and wheezing. Cardiovascular: Positive for palpitations. Negative for chest pain. Gastrointestinal: Positive for abdominal pain and nausea. Negative for vomiting. Endocrine: Negative.     Genitourinary: Positive for flank pain. Negative for pelvic pain and vaginal discharge. Musculoskeletal: Positive for myalgias. Skin: Negative for rash. Allergic/Immunologic: Negative. Neurological: Negative. Hematological: Negative. Psychiatric/Behavioral: Negative. Vitals:    09/10/17 1820   BP: 129/78   Pulse: (!) 106   Resp: 18   Temp: (!) 102.3 °F (39.1 °C)   SpO2: 99%   Weight: 62.6 kg (138 lb)   Height: 5' 7\" (1.702 m)            Physical Exam   Constitutional: She is oriented to person, place, and time. She appears well-developed. HENT:   Head: Normocephalic and atraumatic. Eyes: Pupils are equal, round, and reactive to light. Neck: No JVD present. No tracheal deviation present. No thyromegaly present. Cardiovascular: Regular rhythm, normal heart sounds and intact distal pulses. Exam reveals no gallop and no friction rub. No murmur heard. Tachycardiac rate   Pulmonary/Chest: Effort normal and breath sounds normal. No stridor. No respiratory distress. She has no wheezes. She has no rales. She exhibits no tenderness. Abdominal: Soft. She exhibits no distension and no mass. There is tenderness. There is no rebound and no guarding. +RLQ TTP   Musculoskeletal: She exhibits no edema or tenderness. Lymphadenopathy:     She has no cervical adenopathy. Neurological: She is alert and oriented to person, place, and time. Skin: Skin is warm. No rash noted. She is diaphoretic. No erythema. No pallor. Psychiatric: She has a normal mood and affect. Her behavior is normal. Thought content normal.   Nursing note and vitals reviewed. MDM  Number of Diagnoses or Management Options  BV (bacterial vaginosis):   Fever, unspecified fever cause:   Pyelonephritis:   Diagnosis management comments: Differential: sepsis; UTI; pyelonephritis; appendicitis; TOA; PID; viral syndrome; colitis; kidney stone    Pt says she was here in ED last night but left b/c she didn't feel well.     Do not feel patient is septic but ill; otherwise appears well. . She has a large UTI w/flank pain, fever, leukocytosis. Culture urine. Started IV ABX here. Treat BV. Mild right adnexal TTP. D/c home. Amount and/or Complexity of Data Reviewed  Clinical lab tests: ordered and reviewed  Tests in the radiology section of CPT®: ordered and reviewed      ED Course       Pelvic Exam  Date/Time: 9/10/2017 8:08 PM  Performed by: PA  Procedure duration:  4 minutes. Type of exam performed: bimanual and speculum. External genitalia appearance: normal.    Vaginal exam:  discharge. The amount of discharge was:  mild. The discharge was white, thick and normal.   Cervical exam:  normal, no cervical motion tenderness and os closed. Specimen(s) collected:  chlamydia, GC and vaginal culture. Bimanual exam:  uterine tenderness and right adenexal tenderness. Patient tolerance: Patient tolerated the procedure well with no immediate complications        Recent Results (from the past 12 hour(s))   METABOLIC PANEL, COMPREHENSIVE    Collection Time: 09/10/17  6:30 PM   Result Value Ref Range    Sodium 139 136 - 145 mmol/L    Potassium 3.2 (L) 3.5 - 5.5 mmol/L    Chloride 106 100 - 108 mmol/L    CO2 27 21 - 32 mmol/L    Anion gap 6 3.0 - 18 mmol/L    Glucose 98 74 - 99 mg/dL    BUN 6 (L) 7.0 - 18 MG/DL    Creatinine 0.97 0.6 - 1.3 MG/DL    BUN/Creatinine ratio 6 (L) 12 - 20      GFR est AA >60 >60 ml/min/1.73m2    GFR est non-AA >60 >60 ml/min/1.73m2    Calcium 8.5 8.5 - 10.1 MG/DL    Bilirubin, total 1.3 (H) 0.2 - 1.0 MG/DL    ALT (SGPT) 15 13 - 56 U/L    AST (SGOT) 10 (L) 15 - 37 U/L    Alk.  phosphatase 56 45 - 117 U/L    Protein, total 7.1 6.4 - 8.2 g/dL    Albumin 3.4 3.4 - 5.0 g/dL    Globulin 3.7 2.0 - 4.0 g/dL    A-G Ratio 0.9 0.8 - 1.7     CBC WITH AUTOMATED DIFF    Collection Time: 09/10/17  6:30 PM   Result Value Ref Range    WBC 13.5 (H) 4.6 - 13.2 K/uL    RBC 3.99 (L) 4.20 - 5.30 M/uL    HGB 11.3 (L) 12.0 - 16.0 g/dL    HCT 35.1 35.0 - 45.0 %    MCV 88.0 74.0 - 97.0 FL    MCH 28.3 24.0 - 34.0 PG    MCHC 32.2 31.0 - 37.0 g/dL    RDW 16.7 (H) 11.6 - 14.5 %    PLATELET 686 046 - 990 K/uL    MPV 9.3 9.2 - 11.8 FL    NEUTROPHILS 79 (H) 40 - 73 %    LYMPHOCYTES 10 (L) 21 - 52 %    MONOCYTES 11 (H) 3 - 10 %    EOSINOPHILS 0 0 - 5 %    BASOPHILS 0 0 - 2 %    ABS. NEUTROPHILS 10.7 (H) 1.8 - 8.0 K/UL    ABS. LYMPHOCYTES 1.3 0.9 - 3.6 K/UL    ABS. MONOCYTES 1.4 (H) 0.05 - 1.2 K/UL    ABS. EOSINOPHILS 0.1 0.0 - 0.4 K/UL    ABS. BASOPHILS 0.0 0.0 - 0.1 K/UL    DF AUTOMATED     HCG QL SERUM    Collection Time: 09/10/17  6:30 PM   Result Value Ref Range    HCG, Ql. NEGATIVE  NEG     POC LACTIC ACID    Collection Time: 09/10/17  6:35 PM   Result Value Ref Range    Lactic Acid (POC) 0.8 0.4 - 2.0 mmol/L   EKG, 12 LEAD, INITIAL    Collection Time: 09/10/17  6:38 PM   Result Value Ref Range    Ventricular Rate 104 BPM    Atrial Rate 104 BPM    P-R Interval 120 ms    QRS Duration 76 ms    Q-T Interval 320 ms    QTC Calculation (Bezet) 420 ms    Calculated P Axis 56 degrees    Calculated R Axis 7 degrees    Calculated T Axis 45 degrees    Diagnosis       Sinus tachycardia  Otherwise normal ECG  No previous ECGs available     URINALYSIS W/ RFLX MICROSCOPIC    Collection Time: 09/10/17  7:40 PM   Result Value Ref Range    Color YELLOW      Appearance CLOUDY      Specific gravity 1.021 1.005 - 1.030      pH (UA) 8.0 5.0 - 8.0      Protein 30 (A) NEG mg/dL    Glucose NEGATIVE  NEG mg/dL    Ketone NEGATIVE  NEG mg/dL    Bilirubin NEGATIVE  NEG      Blood TRACE (A) NEG      Urobilinogen 1.0 0.2 - 1.0 EU/dL    Nitrites POSITIVE (A) NEG      Leukocyte Esterase LARGE (A) NEG     WET PREP    Collection Time: 09/10/17  7:57 PM   Result Value Ref Range    Special Requests: NO SPECIAL REQUESTS      Wet prep FEW  CLUE CELLS PRESENT        Wet prep NO TRICHOMONAS SEEN      Wet prep NO YEAST SEEN       8:42 PM  Diagnosis:   1. Pyelonephritis    2. BV (bacterial vaginosis)    3. Fever, unspecified fever cause          Disposition: home    Follow-up Information     Follow up With Details Comments Sadi Moore Schedule an appointment as soon as possible for a visit in 1 day  [facility] [address] [facility]  If symptoms worsen return immediately [address] [place]  [phone]          Patient's Medications   Start Taking    IBUPROFEN (MOTRIN) 800 MG TABLET    Take 1 Tab by mouth every eight (8) hours for 5 days. METRONIDAZOLE (FLAGYL) 500 MG TABLET    Take 1 Tab by mouth two (2) times a day for 7 days. TRIMETHOPRIM-SULFAMETHOXAZOLE (BACTRIM DS) 160-800 MG PER TABLET    Take 1 Tab by mouth two (2) times a day for 7 days. Continue Taking    PNV JT.06/BMIHQLA FUM/FOLIC AC (PRENATAL PO)    Take  by mouth.    These Medications have changed    No medications on file   Stop Taking    No medications on file

## 2017-09-10 NOTE — ED NOTES
Pt back from Ct   Urine sample obtained  Fluids restarted  Family remains at bedside  Call bell within reach

## 2017-09-10 NOTE — ED NOTES
Pt medicated per [facility] instructions for temp 102.9 and 10/10 constant, sharp right sided pelvic and abdominal pain   Pt's comfortable level of pain is 6/10  NS running, zosyn started  Pt and family updated on POC  Call bell within reach  Bed locked and in lowest position  Will continue to monitor

## 2017-09-11 NOTE — DISCHARGE INSTRUCTIONS
Bacterial Vaginosis: Care Instructions  Your Care Instructions    Bacterial vaginosis is a type of vaginal infection. It is caused by excess growth of certain bacteria that are normally found in the vagina. Symptoms can include itching, swelling, pain when you urinate or have sex, and a gray or yellow discharge with a \"fishy\" odor. It is not considered an infection that is spread through sexual contact. Although symptoms can be annoying and uncomfortable, bacterial vaginosis does not usually cause other health problems. However, if you have it while you are pregnant, it can cause complications. While the infection may go away on its own, most doctors use antibiotics to treat it. You may have been prescribed pills or vaginal cream. With treatment, bacterial vaginosis usually clears up in 5 to 7 days. Follow-up care is a key part of your treatment and safety. Be sure to make and go to all appointments, and call your doctor if you are having problems. It's also a good idea to know your test results and keep a list of the medicines you take. How can you care for yourself at home? · Take your antibiotics as directed. Do not stop taking them just because you feel better. You need to take the full course of antibiotics. · Do not eat or drink anything that contains alcohol if you are taking metronidazole (Flagyl). · Keep using your medicine if you start your period. Use pads instead of tampons while using a vaginal cream or suppository. Tampons can absorb the medicine. · Wear loose cotton clothing. Do not wear nylon and other materials that hold body heat and moisture close to the skin. · Do not scratch. Relieve itching with a cold pack or a cool bath. · Do not wash your vaginal area more than once a day. Use plain water or a mild, unscented soap. Do not douche. When should you call for help?   Watch closely for changes in your health, and be sure to contact your doctor if:  · You have unexpected vaginal bleeding. · You have a fever. · You have new or increased pain in your vagina or pelvis. · You are not getting better after 1 week. · Your symptoms return after you finish the course of your medicine. Where can you learn more? Go to http://cruzito-nick.info/. Shannon Bateman in the search box to learn more about \"Bacterial Vaginosis: Care Instructions. \"  Current as of: October 13, 2016  Content Version: 11.3  © 4332-4283 [employer]. Care instructions adapted under license by [employer] (which disclaims liability or warranty for this information). If you have questions about a medical condition or this instruction, always ask your healthcare professional. [address] any warranty or liability for your use of this information.

## 2017-09-12 LAB
ATRIAL RATE: 104 BPM
BACTERIA SPEC CULT: ABNORMAL
CALCULATED P AXIS, ECG09: 56 DEGREES
CALCULATED R AXIS, ECG10: 7 DEGREES
CALCULATED T AXIS, ECG11: 45 DEGREES
DIAGNOSIS, 93000: NORMAL
P-R INTERVAL, ECG05: 120 MS
Q-T INTERVAL, ECG07: 320 MS
QRS DURATION, ECG06: 76 MS
QTC CALCULATION (BEZET), ECG08: 420 MS
SERVICE CMNT-IMP: ABNORMAL
VENTRICULAR RATE, ECG03: 104 BPM

## 2017-09-16 LAB
BACTERIA SPEC CULT: NORMAL
SERVICE CMNT-IMP: NORMAL

## 2019-06-28 ENCOUNTER — OP HISTORICAL/CONVERTED ENCOUNTER (OUTPATIENT)
Dept: OTHER | Age: 29
End: 2019-06-28

## 2019-09-29 ENCOUNTER — IP HISTORICAL/CONVERTED ENCOUNTER (OUTPATIENT)
Dept: OTHER | Age: 29
End: 2019-09-29

## 2020-12-27 ENCOUNTER — HOSPITAL ENCOUNTER (EMERGENCY)
Age: 30
Discharge: HOME | End: 2020-12-27
Attending: EMERGENCY MEDICINE
Payer: MEDICAID

## 2020-12-27 VITALS
BODY MASS INDEX: 25.58 KG/M2 | SYSTOLIC BLOOD PRESSURE: 93 MMHG | TEMPERATURE: 98.4 F | WEIGHT: 163 LBS | HEIGHT: 67 IN | RESPIRATION RATE: 16 BRPM | HEART RATE: 81 BPM | DIASTOLIC BLOOD PRESSURE: 57 MMHG | OXYGEN SATURATION: 98 %

## 2020-12-27 DIAGNOSIS — G43.909 MIGRAINE WITHOUT STATUS MIGRAINOSUS, NOT INTRACTABLE, UNSPECIFIED MIGRAINE TYPE: Primary | ICD-10-CM

## 2020-12-27 PROCEDURE — 99283 EMERGENCY DEPT VISIT LOW MDM: CPT

## 2020-12-27 PROCEDURE — 74011250636 HC RX REV CODE- 250/636: Performed by: EMERGENCY MEDICINE

## 2020-12-27 PROCEDURE — 96372 THER/PROPH/DIAG INJ SC/IM: CPT

## 2020-12-27 RX ORDER — KETOROLAC TROMETHAMINE 30 MG/ML
60 INJECTION, SOLUTION INTRAMUSCULAR; INTRAVENOUS ONCE
Status: COMPLETED | OUTPATIENT
Start: 2020-12-27 | End: 2020-12-27

## 2020-12-27 RX ORDER — IBUPROFEN 800 MG/1
800 TABLET ORAL
Qty: 20 TAB | Refills: 0 | Status: SHIPPED | OUTPATIENT
Start: 2020-12-27 | End: 2021-01-03

## 2020-12-27 RX ORDER — PROMETHAZINE HYDROCHLORIDE 25 MG/ML
25 INJECTION, SOLUTION INTRAMUSCULAR; INTRAVENOUS
Status: COMPLETED | OUTPATIENT
Start: 2020-12-27 | End: 2020-12-27

## 2020-12-27 RX ORDER — PROMETHAZINE HYDROCHLORIDE 25 MG/1
25 TABLET ORAL
Qty: 12 TAB | Refills: 0 | Status: SHIPPED | OUTPATIENT
Start: 2020-12-27 | End: 2021-01-17

## 2020-12-27 RX ADMIN — KETOROLAC TROMETHAMINE 60 MG: 30 INJECTION, SOLUTION INTRAMUSCULAR at 09:27

## 2020-12-27 RX ADMIN — PROMETHAZINE HYDROCHLORIDE 25 MG: 25 INJECTION INTRAMUSCULAR; INTRAVENOUS at 09:27

## 2020-12-27 NOTE — ED PROVIDER NOTES
EMERGENCY DEPARTMENT HISTORY AND PHYSICAL EXAM      Date: 2020  Patient Name: Jey Michael    History of Presenting Illness     Chief Complaint   Patient presents with    Headache     pt c/o HA onset 1 week ago; no distress noted; pt reports being unable to sleep; AOx4; pt denies N/V/D; pt reports pain 10/10       History Provided By: Patient    HPI: Jey Michael, 35 y.o. female with a past medical history significant Migraines presents to the ED with cc of migraine headache. Onset 1 week ago waxing waning symptoms pain noted in the right temporal frontal region radiating into the right neck and shoulder upper arm. This headache is typical of prior migraines been no fever no focal neurologic symptoms. There are no other complaints, changes, or physical findings at this time. PCP: None    No current facility-administered medications on file prior to encounter. Current Outpatient Medications on File Prior to Encounter   Medication Sig Dispense Refill    [DISCONTINUED] PNV TX.09/PVIEOKW FUM/FOLIC AC (PRENATAL PO) Take  by mouth.          Past History     Past Medical History:  Past Medical History:   Diagnosis Date    Headache        Past Surgical History:  Past Surgical History:   Procedure Laterality Date    HX GYN       section       Family History:  Family History   Problem Relation Age of Onset    Ovarian Cancer Mother     Other Maternal Grandmother         brain aneurysm    Diabetes Paternal Grandmother     Heart Attack Paternal Grandmother     Diabetes Paternal Grandfather     Heart Attack Paternal Grandfather        Social History:  Social History     Tobacco Use    Smoking status: Current Every Day Smoker     Packs/day: 0.25    Smokeless tobacco: Never Used   Substance Use Topics    Alcohol use: No    Drug use: No       Allergies:  No Known Allergies      Review of Systems   Review of all other symptoms negative  Review of Systems    Physical Exam   Healthy young female no acute distress mildly uncomfortable  Physical Exam  Vitals signs and nursing note reviewed. Constitutional:       General: She is not in acute distress. Appearance: Normal appearance. She is not ill-appearing, toxic-appearing or diaphoretic. HENT:      Head: Normocephalic and atraumatic. Right Ear: Tympanic membrane normal.      Left Ear: Tympanic membrane normal.      Nose: Nose normal.      Mouth/Throat:      Mouth: Mucous membranes are moist.   Eyes:      Extraocular Movements: Extraocular movements intact. Conjunctiva/sclera: Conjunctivae normal.      Pupils: Pupils are equal, round, and reactive to light. Neck:      Musculoskeletal: Normal range of motion and neck supple. No neck rigidity or muscular tenderness. Cardiovascular:      Heart sounds: No murmur. Pulmonary:      Effort: No respiratory distress. Breath sounds: No wheezing, rhonchi or rales. Chest:      Chest wall: No tenderness. Abdominal:      General: Abdomen is flat. Palpations: There is no mass. Tenderness: There is no abdominal tenderness. There is no right CVA tenderness, left CVA tenderness, guarding or rebound. Hernia: No hernia is present. Musculoskeletal: Normal range of motion. General: No tenderness, deformity or signs of injury. Right lower leg: No edema. Left lower leg: No edema. Skin:     General: Skin is warm. Findings: No erythema or rash. Neurological:      General: No focal deficit present. Mental Status: She is alert and oriented to person, place, and time. Cranial Nerves: No cranial nerve deficit. Sensory: No sensory deficit. Motor: No weakness. Gait: Gait normal.      Deep Tendon Reflexes: Reflexes normal.   Psychiatric:         Mood and Affect: Mood normal.         Behavior: Behavior normal.         Thought Content:  Thought content normal.         Lab and Diagnostic Study Results     Labs -   No results found for this or any previous visit (from the past 12 hour(s)). Radiologic Studies -   @lastxrresult@  CT Results  (Last 48 hours)    None        CXR Results  (Last 48 hours)    None            Medical Decision Making   - I am the first provider for this patient. - I reviewed the vital signs, available nursing notes, past medical history, past surgical history, family history and social history. - Initial assessment performed. The patients presenting problems have been discussed, and they are in agreement with the care plan formulated and outlined with them. I have encouraged them to ask questions as they arise throughout their visit. Vital Signs-Reviewed the patient's vital signs. Patient Vitals for the past 12 hrs:   Temp Pulse Resp BP SpO2   12/27/20 0905 98.4 °F (36.9 °C) 88 16 127/83 99 %       Records Reviewed: Nursing Notes and Old Medical Records    The patient presents with migraine headache with a differential diagnosis of , tension headache ,migraine headache, sinus infection trigeminal neuralgia shingles subarachnoid hemorrhage      ED Course:          Provider Notes (Medical Decision Making):   50-year-old female presents with migraine headache typical of prior migraines treated with injection and improvement will discharge to home  MDM       Procedures   Medical Decision Makingedical Decision Making  Performed by: Jackie Turner MD  PROCEDURES: None  Procedures       Disposition   Disposition: Condition improved  DC- Adult Discharges: All of the diagnostic tests were reviewed and questions answered. Diagnosis, care plan and treatment options were discussed. The patient understands the instructions and will follow up as directed. The patients results have been reviewed with them. They have been counseled regarding their diagnosis.   The patient and spouse/SO verbally convey understanding and agreement of the signs, symptoms, diagnosis, treatment and prognosis and additionally agrees to follow up as recommended with their PCP in 24 - 48 hours. They also agree with the care-plan and convey that all of their questions have been answered. I have also put together some discharge instructions for them that include: 1) educational information regarding their diagnosis, 2) how to care for their diagnosis at home, as well a 3) list of reasons why they would want to return to the ED prior to their follow-up appointment, should their condition change. DISCHARGE PLAN:  1. There are no discharge medications for this patient. 2.   Follow-up Information    None       3. Return to ED if worse   4. There are no discharge medications for this patient. Diagnosis     Clinical Impression: Migraine headache  Attestations:    Betty Shi MD    Please note that this dictation was completed with [employer], the computer voice recognition software. Quite often unanticipated grammatical, syntax, homophones, and other interpretive errors are inadvertently transcribed by the computer software. Please disregard these errors. Please excuse any errors that have escaped final proofreading. Thank you.

## 2021-01-17 ENCOUNTER — HOSPITAL ENCOUNTER (INPATIENT)
Age: 31
LOS: 2 days | Discharge: HOME | DRG: 720 | End: 2021-01-19
Attending: EMERGENCY MEDICINE | Admitting: FAMILY MEDICINE
Payer: MEDICAID

## 2021-01-17 ENCOUNTER — APPOINTMENT (OUTPATIENT)
Dept: CT IMAGING | Age: 31
DRG: 720 | End: 2021-01-17
Attending: EMERGENCY MEDICINE
Payer: MEDICAID

## 2021-01-17 DIAGNOSIS — N12 PYELONEPHRITIS: Primary | ICD-10-CM

## 2021-01-17 PROBLEM — Z72.0 NICOTINE ABUSE: Status: ACTIVE | Noted: 2021-01-17

## 2021-01-17 LAB
ALBUMIN SERPL-MCNC: 3.2 G/DL (ref 3.5–5)
ALBUMIN/GLOB SERPL: 0.7 {RATIO} (ref 1.1–2.2)
ALP SERPL-CCNC: 69 U/L (ref 45–117)
ALT SERPL-CCNC: 11 U/L (ref 12–78)
ANION GAP SERPL CALC-SCNC: 10 MMOL/L (ref 5–15)
APPEARANCE UR: ABNORMAL
AST SERPL W P-5'-P-CCNC: 12 U/L (ref 15–37)
BACTERIA URNS QL MICRO: ABNORMAL /HPF
BASOPHILS # BLD: 0.1 K/UL (ref 0–0.2)
BASOPHILS NFR BLD: 0 % (ref 0–2.5)
BILIRUB SERPL-MCNC: 0.7 MG/DL (ref 0.2–1)
BILIRUB UR QL: NEGATIVE
BUN SERPL-MCNC: 7 MG/DL (ref 6–20)
BUN/CREAT SERPL: 6 (ref 12–20)
CA-I BLD-MCNC: 8.2 MG/DL (ref 8.5–10.1)
CHLORIDE SERPL-SCNC: 101 MMOL/L (ref 97–108)
CO2 SERPL-SCNC: 28 MMOL/L (ref 21–32)
COLOR UR: ABNORMAL
CREAT SERPL-MCNC: 1.13 MG/DL (ref 0.55–1.02)
EOSINOPHIL # BLD: 0.2 K/UL (ref 0–0.7)
EOSINOPHIL NFR BLD: 1 % (ref 0.9–2.9)
ERYTHROCYTE [DISTWIDTH] IN BLOOD BY AUTOMATED COUNT: 18.5 % (ref 11.5–14.5)
GLOBULIN SER CALC-MCNC: 4.6 G/DL (ref 2–4)
GLUCOSE SERPL-MCNC: 86 MG/DL (ref 65–100)
GLUCOSE UR STRIP.AUTO-MCNC: NEGATIVE MG/DL
HCG UR QL: NEGATIVE
HCT VFR BLD AUTO: 30.9 % (ref 36–46)
HGB BLD-MCNC: 9.6 G/DL (ref 13.5–17.5)
HGB UR QL STRIP: ABNORMAL
KETONES UR QL STRIP.AUTO: NEGATIVE MG/DL
LEUKOCYTE ESTERASE UR QL STRIP.AUTO: ABNORMAL
LIPASE SERPL-CCNC: 106 U/L (ref 73–393)
LYMPHOCYTES # BLD: 1.7 K/UL (ref 1–4.8)
LYMPHOCYTES NFR BLD: 11 % (ref 20.5–51.1)
MCH RBC QN AUTO: 26.4 PG (ref 31–34)
MCHC RBC AUTO-ENTMCNC: 31 G/DL (ref 31–36)
MCV RBC AUTO: 85 FL (ref 80–100)
MONOCYTES # BLD: 1.9 K/UL (ref 0.2–2.4)
MONOCYTES NFR BLD: 11 % (ref 1.7–9.3)
NEUTS SEG # BLD: 12.6 K/UL (ref 1.8–7.7)
NEUTS SEG NFR BLD: 77 % (ref 42–75)
NITRITE UR QL STRIP.AUTO: POSITIVE
NRBC # BLD: 0 K/UL
NRBC BLD-RTO: 0 PER 100 WBC
PH UR STRIP: 6 [PH] (ref 5–8)
PLATELET # BLD AUTO: 524 K/UL
PMV BLD AUTO: 6.8 FL (ref 6.5–11.5)
POTASSIUM SERPL-SCNC: 3.5 MMOL/L (ref 3.5–5.1)
PROT SERPL-MCNC: 7.8 G/DL (ref 6.4–8.2)
PROT UR STRIP-MCNC: 100 MG/DL
RBC # BLD AUTO: 3.63 M/UL (ref 4.5–5.9)
RBC #/AREA URNS HPF: ABNORMAL /HPF (ref 0–3)
SODIUM SERPL-SCNC: 139 MMOL/L (ref 136–145)
SP GR UR REFRACTOMETRY: 1.02 (ref 1–1.03)
UROBILINOGEN UR QL STRIP.AUTO: 2 EU/DL (ref 0.2–1)
WBC # BLD AUTO: 16.4 K/UL (ref 4.4–11.3)
WBC URNS QL MICRO: >100 /HPF (ref 0–5)

## 2021-01-17 PROCEDURE — 80053 COMPREHEN METABOLIC PANEL: CPT

## 2021-01-17 PROCEDURE — 96376 TX/PRO/DX INJ SAME DRUG ADON: CPT

## 2021-01-17 PROCEDURE — G0378 HOSPITAL OBSERVATION PER HR: HCPCS

## 2021-01-17 PROCEDURE — 83690 ASSAY OF LIPASE: CPT

## 2021-01-17 PROCEDURE — 87077 CULTURE AEROBIC IDENTIFY: CPT

## 2021-01-17 PROCEDURE — 85025 COMPLETE CBC W/AUTO DIFF WBC: CPT

## 2021-01-17 PROCEDURE — 99285 EMERGENCY DEPT VISIT HI MDM: CPT

## 2021-01-17 PROCEDURE — 74011250636 HC RX REV CODE- 250/636: Performed by: EMERGENCY MEDICINE

## 2021-01-17 PROCEDURE — 87086 URINE CULTURE/COLONY COUNT: CPT

## 2021-01-17 PROCEDURE — 96374 THER/PROPH/DIAG INJ IV PUSH: CPT

## 2021-01-17 PROCEDURE — 74011250636 HC RX REV CODE- 250/636: Performed by: PHYSICIAN ASSISTANT

## 2021-01-17 PROCEDURE — 81025 URINE PREGNANCY TEST: CPT

## 2021-01-17 PROCEDURE — 74176 CT ABD & PELVIS W/O CONTRAST: CPT

## 2021-01-17 PROCEDURE — 65270000029 HC RM PRIVATE

## 2021-01-17 PROCEDURE — 96375 TX/PRO/DX INJ NEW DRUG ADDON: CPT

## 2021-01-17 PROCEDURE — 74011250637 HC RX REV CODE- 250/637: Performed by: PHYSICIAN ASSISTANT

## 2021-01-17 PROCEDURE — 87186 SC STD MICRODIL/AGAR DIL: CPT

## 2021-01-17 PROCEDURE — 81001 URINALYSIS AUTO W/SCOPE: CPT

## 2021-01-17 RX ORDER — ACETAMINOPHEN 325 MG/1
650 TABLET ORAL
Status: DISCONTINUED | OUTPATIENT
Start: 2021-01-17 | End: 2021-01-19 | Stop reason: HOSPADM

## 2021-01-17 RX ORDER — SODIUM CHLORIDE 0.9 % (FLUSH) 0.9 %
5-40 SYRINGE (ML) INJECTION AS NEEDED
Status: DISCONTINUED | OUTPATIENT
Start: 2021-01-17 | End: 2021-01-19 | Stop reason: HOSPADM

## 2021-01-17 RX ORDER — FAMOTIDINE 20 MG/1
20 TABLET, FILM COATED ORAL 2 TIMES DAILY
Status: DISCONTINUED | OUTPATIENT
Start: 2021-01-17 | End: 2021-01-19 | Stop reason: HOSPADM

## 2021-01-17 RX ORDER — POLYETHYLENE GLYCOL 3350 17 G/17G
17 POWDER, FOR SOLUTION ORAL DAILY PRN
Status: DISCONTINUED | OUTPATIENT
Start: 2021-01-17 | End: 2021-01-19 | Stop reason: HOSPADM

## 2021-01-17 RX ORDER — HYDROCODONE BITARTRATE AND ACETAMINOPHEN 5; 325 MG/1; MG/1
1 TABLET ORAL
Status: DISCONTINUED | OUTPATIENT
Start: 2021-01-17 | End: 2021-01-19 | Stop reason: HOSPADM

## 2021-01-17 RX ORDER — SODIUM CHLORIDE 0.9 % (FLUSH) 0.9 %
5-40 SYRINGE (ML) INJECTION EVERY 8 HOURS
Status: DISCONTINUED | OUTPATIENT
Start: 2021-01-17 | End: 2021-01-19 | Stop reason: HOSPADM

## 2021-01-17 RX ORDER — LEVOFLOXACIN 5 MG/ML
750 INJECTION, SOLUTION INTRAVENOUS
Status: COMPLETED | OUTPATIENT
Start: 2021-01-17 | End: 2021-01-17

## 2021-01-17 RX ORDER — KETOROLAC TROMETHAMINE 30 MG/ML
15 INJECTION, SOLUTION INTRAMUSCULAR; INTRAVENOUS
Status: DISCONTINUED | OUTPATIENT
Start: 2021-01-17 | End: 2021-01-19 | Stop reason: HOSPADM

## 2021-01-17 RX ORDER — MORPHINE SULFATE 2 MG/ML
2 INJECTION, SOLUTION INTRAMUSCULAR; INTRAVENOUS
Status: COMPLETED | OUTPATIENT
Start: 2021-01-17 | End: 2021-01-17

## 2021-01-17 RX ORDER — PROMETHAZINE HYDROCHLORIDE 25 MG/1
12.5 TABLET ORAL
Status: DISCONTINUED | OUTPATIENT
Start: 2021-01-17 | End: 2021-01-19 | Stop reason: HOSPADM

## 2021-01-17 RX ORDER — HYDROCODONE BITARTRATE AND ACETAMINOPHEN 5; 325 MG/1; MG/1
1 TABLET ORAL
Status: DISPENSED | OUTPATIENT
Start: 2021-01-17 | End: 2021-01-18

## 2021-01-17 RX ORDER — SODIUM CHLORIDE 9 MG/ML
125 INJECTION, SOLUTION INTRAVENOUS CONTINUOUS
Status: DISCONTINUED | OUTPATIENT
Start: 2021-01-17 | End: 2021-01-19 | Stop reason: HOSPADM

## 2021-01-17 RX ORDER — LEVOFLOXACIN 5 MG/ML
750 INJECTION, SOLUTION INTRAVENOUS EVERY 24 HOURS
Status: DISCONTINUED | OUTPATIENT
Start: 2021-01-18 | End: 2021-01-19 | Stop reason: HOSPADM

## 2021-01-17 RX ORDER — MORPHINE SULFATE 2 MG/ML
4 INJECTION, SOLUTION INTRAMUSCULAR; INTRAVENOUS
Status: COMPLETED | OUTPATIENT
Start: 2021-01-17 | End: 2021-01-17

## 2021-01-17 RX ORDER — ENOXAPARIN SODIUM 100 MG/ML
40 INJECTION SUBCUTANEOUS DAILY
Status: DISCONTINUED | OUTPATIENT
Start: 2021-01-18 | End: 2021-01-19 | Stop reason: HOSPADM

## 2021-01-17 RX ORDER — ONDANSETRON 2 MG/ML
4 INJECTION INTRAMUSCULAR; INTRAVENOUS
Status: DISCONTINUED | OUTPATIENT
Start: 2021-01-17 | End: 2021-01-19 | Stop reason: HOSPADM

## 2021-01-17 RX ORDER — ACETAMINOPHEN 650 MG/1
650 SUPPOSITORY RECTAL
Status: DISCONTINUED | OUTPATIENT
Start: 2021-01-17 | End: 2021-01-19 | Stop reason: HOSPADM

## 2021-01-17 RX ADMIN — ONDANSETRON 4 MG: 2 INJECTION INTRAMUSCULAR; INTRAVENOUS at 21:35

## 2021-01-17 RX ADMIN — SODIUM CHLORIDE 125 ML/HR: 9 INJECTION, SOLUTION INTRAVENOUS at 21:41

## 2021-01-17 RX ADMIN — MORPHINE SULFATE 4 MG: 2 INJECTION, SOLUTION INTRAMUSCULAR; INTRAVENOUS at 21:36

## 2021-01-17 RX ADMIN — MORPHINE SULFATE 2 MG: 2 INJECTION, SOLUTION INTRAMUSCULAR; INTRAVENOUS at 18:43

## 2021-01-17 RX ADMIN — FAMOTIDINE 20 MG: 20 TABLET ORAL at 21:40

## 2021-01-17 RX ADMIN — Medication 10 ML: at 21:38

## 2021-01-17 RX ADMIN — LEVOFLOXACIN 750 MG: 5 INJECTION, SOLUTION INTRAVENOUS at 18:43

## 2021-01-17 NOTE — ED TRIAGE NOTES
Pt reports RLQ pain, vomiting, urinary frequency and urgency, pain with urination, coughing. A&O, ambulatory. Resp even and unlabored.  No acute distress noted

## 2021-01-18 LAB
ANION GAP SERPL CALC-SCNC: 11 MMOL/L (ref 5–15)
BASOPHILS # BLD: 0.2 K/UL (ref 0–0.2)
BASOPHILS NFR BLD: 1 % (ref 0–2.5)
BUN SERPL-MCNC: 8 MG/DL (ref 6–20)
BUN/CREAT SERPL: 7 (ref 12–20)
CA-I BLD-MCNC: 7.6 MG/DL (ref 8.5–10.1)
CHLORIDE SERPL-SCNC: 105 MMOL/L (ref 97–108)
CO2 SERPL-SCNC: 23 MMOL/L (ref 21–32)
CREAT SERPL-MCNC: 1.14 MG/DL (ref 0.55–1.02)
EOSINOPHIL # BLD: 0.4 K/UL (ref 0–0.7)
EOSINOPHIL NFR BLD: 2 % (ref 0.9–2.9)
ERYTHROCYTE [DISTWIDTH] IN BLOOD BY AUTOMATED COUNT: 16.4 % (ref 11.5–14.5)
GLUCOSE SERPL-MCNC: 100 MG/DL (ref 65–100)
HCT VFR BLD AUTO: 27 % (ref 36–46)
HGB BLD-MCNC: 8.6 G/DL (ref 13.5–17.5)
LYMPHOCYTES # BLD: 2.2 K/UL (ref 1–4.8)
LYMPHOCYTES NFR BLD: 14 % (ref 20.5–51.1)
MCH RBC QN AUTO: 27.1 PG (ref 31–34)
MCHC RBC AUTO-ENTMCNC: 31.9 G/DL (ref 31–36)
MCV RBC AUTO: 84.8 FL (ref 80–100)
MONOCYTES # BLD: 1.4 K/UL (ref 0–0.8)
MONOCYTES NFR BLD: 9 % (ref 1.7–9.3)
NEUTS SEG # BLD: 12 K/UL (ref 1.8–7.7)
NEUTS SEG NFR BLD: 74 % (ref 42–75)
PLATELET # BLD AUTO: 455 K/UL
PMV BLD AUTO: 7.2 FL (ref 6.5–11.5)
POTASSIUM SERPL-SCNC: 3.2 MMOL/L (ref 3.5–5.1)
RBC # BLD AUTO: 3.18 M/UL
SODIUM SERPL-SCNC: 139 MMOL/L (ref 136–145)
WBC # BLD AUTO: 16.2 K/UL (ref 4.4–11.3)

## 2021-01-18 PROCEDURE — 85025 COMPLETE CBC W/AUTO DIFF WBC: CPT

## 2021-01-18 PROCEDURE — G0378 HOSPITAL OBSERVATION PER HR: HCPCS

## 2021-01-18 PROCEDURE — 65270000029 HC RM PRIVATE

## 2021-01-18 PROCEDURE — 96376 TX/PRO/DX INJ SAME DRUG ADON: CPT

## 2021-01-18 PROCEDURE — 74011250637 HC RX REV CODE- 250/637: Performed by: FAMILY MEDICINE

## 2021-01-18 PROCEDURE — 96372 THER/PROPH/DIAG INJ SC/IM: CPT

## 2021-01-18 PROCEDURE — 74011250636 HC RX REV CODE- 250/636: Performed by: PHYSICIAN ASSISTANT

## 2021-01-18 PROCEDURE — 80048 BASIC METABOLIC PNL TOTAL CA: CPT

## 2021-01-18 PROCEDURE — 74011250637 HC RX REV CODE- 250/637: Performed by: PHYSICIAN ASSISTANT

## 2021-01-18 PROCEDURE — 87040 BLOOD CULTURE FOR BACTERIA: CPT

## 2021-01-18 PROCEDURE — 36415 COLL VENOUS BLD VENIPUNCTURE: CPT

## 2021-01-18 RX ORDER — POTASSIUM CHLORIDE 750 MG/1
20 TABLET, FILM COATED, EXTENDED RELEASE ORAL 2 TIMES DAILY
Status: DISCONTINUED | OUTPATIENT
Start: 2021-01-18 | End: 2021-01-18

## 2021-01-18 RX ORDER — POTASSIUM CHLORIDE 20 MEQ/1
20 TABLET, EXTENDED RELEASE ORAL 2 TIMES DAILY
Status: DISCONTINUED | OUTPATIENT
Start: 2021-01-18 | End: 2021-01-19 | Stop reason: HOSPADM

## 2021-01-18 RX ORDER — GUAIFENESIN 600 MG/1
600 TABLET, EXTENDED RELEASE ORAL EVERY 12 HOURS
Status: DISCONTINUED | OUTPATIENT
Start: 2021-01-18 | End: 2021-01-19 | Stop reason: HOSPADM

## 2021-01-18 RX ADMIN — ACETAMINOPHEN 650 MG: 325 TABLET ORAL at 01:04

## 2021-01-18 RX ADMIN — FAMOTIDINE 20 MG: 20 TABLET ORAL at 17:25

## 2021-01-18 RX ADMIN — ENOXAPARIN SODIUM 40 MG: 40 INJECTION SUBCUTANEOUS at 08:26

## 2021-01-18 RX ADMIN — HYDROCODONE BITARTRATE AND ACETAMINOPHEN 1 TABLET: 5; 325 TABLET ORAL at 20:11

## 2021-01-18 RX ADMIN — HYDROCODONE BITARTRATE AND ACETAMINOPHEN 1 TABLET: 5; 325 TABLET ORAL at 08:26

## 2021-01-18 RX ADMIN — FAMOTIDINE 20 MG: 20 TABLET ORAL at 08:26

## 2021-01-18 RX ADMIN — POTASSIUM CHLORIDE 20 MEQ: 1500 TABLET, EXTENDED RELEASE ORAL at 17:25

## 2021-01-18 RX ADMIN — SODIUM CHLORIDE 125 ML/HR: 9 INJECTION, SOLUTION INTRAVENOUS at 20:11

## 2021-01-18 RX ADMIN — GUAIFENESIN 600 MG: 600 TABLET, EXTENDED RELEASE ORAL at 20:11

## 2021-01-18 RX ADMIN — POTASSIUM CHLORIDE 20 MEQ: 1500 TABLET, EXTENDED RELEASE ORAL at 12:57

## 2021-01-18 RX ADMIN — GUAIFENESIN 600 MG: 600 TABLET, EXTENDED RELEASE ORAL at 12:57

## 2021-01-18 RX ADMIN — LEVOFLOXACIN 750 MG: 5 INJECTION, SOLUTION INTRAVENOUS at 17:25

## 2021-01-18 RX ADMIN — GUAIFENESIN 600 MG: 600 TABLET, EXTENDED RELEASE ORAL at 01:04

## 2021-01-18 RX ADMIN — Medication 10 ML: at 13:45

## 2021-01-18 NOTE — H&P
History and Physical    Subjective:     Evan St is a 35 y.o. female  has a past medical history of Headache. She also has no past medical history of Asthma, Diabetes ([address] or Heart abnormality. Patient presents with right lower quadrant pain vomiting, urinary frequency and urgency along with pain with urination. Complaints began about 48 hours prior to arrival.  Patient takes no medications on a daily basis and otherwise healthy except for intermittent migraines. Patient examined and found to be in no acute distress is more flank pain is right lower quadrant pain. Is any vaginal discharge or related GYN symptoms. Patient will be admitted to [facility] hospital service expect length of stay of 2-3 midnight.       Past Medical History:   Diagnosis Date    Headache       Past Surgical History:   Procedure Laterality Date    HX GYN       section     Family History   Problem Relation Age of Onset    Ovarian Cancer Mother     Other Maternal Grandmother         brain aneurysm    Diabetes Paternal Grandmother     Heart Attack Paternal Grandmother     Diabetes Paternal Grandfather     Heart Attack Paternal Grandfather       Social History     Tobacco Use    Smoking status: Current Every Day Smoker     Packs/day: 0.25    Smokeless tobacco: Never Used   Substance Use Topics    Alcohol use: No       Prior to Admission medications    Not on File     No Known Allergies       REVIEW OF SYSTEMS:       Total of 12 systems reviewed as follows:       POSITIVE= underlined text  Negative = text not underlined  General:  fever, chills, sweats, generalized weakness, weight loss/gain,      loss of appetite   Eyes:    blurred vision, eye pain, loss of vision, double vision  ENT:    rhinorrhea, pharyngitis   Respiratory:  cough, sputum production, SOB, MERCEDES, wheezing, pleuritic pain   Cardiology:   chest pain, palpitations, orthopnea, PND, edema, syncope   Gastrointestinal:  abdominal pain, flank pain, nausea, diarrhea, dysphagia, constipation, bleeding   Genitourinary:  frequency, urgency, dysuria, hematuria, incontinence   Muskuloskeletal :  arthralgia, myalgia, back pain  Hematology: easy bruising, nose or gum bleeding, lymphadenopathy   Dermatological: rash, ulceration, pruritis, color change / jaundice  Endocrine:   hot flashes or polydipsia   Neurological:  headache, dizziness, confusion, focal weakness, paresthesia,     Speech difficulties, memory loss, gait difficulty  Psychological: Feelings of anxiety, depression, agitation       Objective:   VITALS:    Visit Vitals  /67 (BP 1 Location: Right arm, BP Patient Position: At rest;Sitting)   Pulse 92   Temp (!) 100.6 °F (38.1 °C)   Resp 16   Ht 5' 7\" (1.702 m)   Wt 71.7 kg (158 lb)   SpO2 98%   BMI 24.75 kg/m²       PHYSICAL EXAM:    General:    Alert, cooperative, no distress, appears stated age. HEENT: Atraumatic, anicteric sclerae, pink conjunctivae     No oral ulcers, mucosa moist, throat clear, dentition fair  Neck:  Supple, symmetrical,  thyroid: non tender  Lungs:   Clear to auscultation bilaterally. No Wheezing or Rhonchi. No rales. Chest wall:  No tenderness  No Accessory muscle use. Heart:   Regular  rhythm,  No  murmur   No edema  Abdomen:   Soft, non-tender. Not distended. Bowel sounds normal, flank CVA tenderness  Extremities: No cyanosis. No clubbing,      Skin turgor normal, Capillary refill normal, Radial dial pulse 2+  Skin:     Not pale. Not Jaundiced  No rashes   Psych:  Good insight. Not depressed. Not anxious or agitated. Neurologic: EOMs intact. No facial asymmetry. No aphasia or slurred speech. Symmetrical strength,   Sensation grossly intact.  Alert and oriented X 4.     _______________________________________________________________________  Care Plan discussed with:    Comments   Patient X    Family      RN X    Care Manager                    Consultant: _______________________________________________________________________  Expected  Disposition:   Home with Family X   HH/PT/OT/RN    SNF/LTC    SHEFALI    ________________________________________________________________________  TOTAL TIME:  54 Minutes    Critical Care Provided     Minutes non procedure based      Comments    X Reviewed previous records   >50% of visit spent in counseling and coordination of care X Discussion with patient and/or family and questions answered       ________________________________________________________________________    Labs:  Recent Results (from the past 24 hour(s))   URINALYSIS W/ RFLX MICROSCOPIC    Collection Time: 01/17/21  5:30 PM   Result Value Ref Range    Color Yellow/Straw      Appearance Cloudy (A) Clear      Specific gravity 1.020 1.003 - 1.030      pH (UA) 6.0 5.0 - 8.0      Protein 100 (A) Negative mg/dL    Glucose Negative Negative mg/dL    Ketone Negative Negative mg/dL    Bilirubin Negative Negative      Blood Large (A) Negative      Urobilinogen 2.0 (H) 0.2 - 1.0 EU/dL    Nitrites Positive (A) Negative      Leukocyte Esterase Large (A) Negative     HCG URINE, QL    Collection Time: 01/17/21  5:30 PM   Result Value Ref Range    HCG urine, QL Negative Negative     URINE MICROSCOPIC    Collection Time: 01/17/21  5:30 PM   Result Value Ref Range    WBC >100 (H) 0 - 5 /hpf    RBC 0-5 0 - 3 /hpf    Bacteria 4+ (A) Negative /hpf   CBC WITH AUTOMATED DIFF    Collection Time: 01/17/21  6:49 PM   Result Value Ref Range    WBC 16.4 (H) 4.4 - 11.3 K/uL    RBC 3.63 (L) 4.50 - 5.90 M/uL    HGB 9.6 (L) 13.5 - 17.5 g/dL    HCT 30.9 (L) 36 - 46 %    MCV 85.0 80 - 100 FL    MCH 26.4 (L) 31 - 34 PG    MCHC 31.0 31.0 - 36.0 g/dL    RDW 18.5 (H) 11.5 - 14.5 %    PLATELET 317 K/uL    MPV 6.8 6.5 - 11.5 FL    NRBC 0.0  WBC    ABSOLUTE NRBC 0.00 K/uL    NEUTROPHILS 77 (H) 42 - 75 %    LYMPHOCYTES 11 (L) 20.5 - 51.1 %    MONOCYTES 11 (H) 1.7 - 9.3 %    EOSINOPHILS 1 0.9 - 2.9 % BASOPHILS 0 0.0 - 2.5 %    ABS. NEUTROPHILS 12.6 (H) 1.8 - 7.7 K/UL    ABS. LYMPHOCYTES 1.7 1.0 - 4.8 K/UL    ABS. MONOCYTES 1.9 0.2 - 2.4 K/UL    ABS. EOSINOPHILS 0.2 0.0 - 0.7 K/UL    ABS. BASOPHILS 0.1 0.0 - 0.2 K/UL   METABOLIC PANEL, COMPREHENSIVE    Collection Time: 01/17/21  6:49 PM   Result Value Ref Range    Sodium 139 136 - 145 mmol/L    Potassium 3.5 3.5 - 5.1 mmol/L    Chloride 101 97 - 108 mmol/L    CO2 28 21 - 32 mmol/L    Anion gap 10 5 - 15 mmol/L    Glucose 86 65 - 100 mg/dL    BUN 7 6 - 20 mg/dL    Creatinine 1.13 (H) 0.55 - 1.02 mg/dL    BUN/Creatinine ratio 6 (L) 12 - 20      GFR est AA >60 >60 ml/min/1.73m2    GFR est non-AA 57 (L) >60 ml/min/1.73m2    Calcium 8.2 (L) 8.5 - 10.1 mg/dL    Bilirubin, total 0.7 0.2 - 1.0 mg/dL    AST (SGOT) 12 (L) 15 - 37 U/L    ALT (SGPT) 11 (L) 12 - 78 U/L    Alk. phosphatase 69 45 - 117 U/L    Protein, total 7.8 6.4 - 8.2 g/dL    Albumin 3.2 (L) 3.5 - 5.0 g/dL    Globulin 4.6 (H) 2.0 - 4.0 g/dL    A-G Ratio 0.7 (L) 1.1 - 2.2     LIPASE    Collection Time: 01/17/21  6:49 PM   Result Value Ref Range    Lipase 106 73 - 393 U/L       Imaging:  Ct Abd Pelv Wo Cont    Result Date: 1/17/2021  Axial images from lung bases to the pubic symphysis were obtained without contrast. Sagittal and coronal reformatted images were reviewed. All CT scans at this facility are performed using dose reduction optimization techniques as appropriate to a performed exam including the following:   automated exposure control, adjustments of the mA and/or kV according to patient size, or use of iterative reconstruction technique. Comparison with September 10, 2017. INDICATION: Pain. Lung bases are clear. Liver is enlarged measuring 19.6 cm in length. No discrete mass lesions are seen in the liver, spleen, pancreas or adrenal glands. No calcified gallstones. The abdominal aorta is normal in caliber and contour. No renal calcifications. Minimal right perinephric inflammation.  No hydronephrosis or hydroureter. No stones are seen along the course of either ureter. Urinary bladder is poorly distended and appears slightly thick-walled. There is a 3.5 x 3.8 cm cystic mass posterior to uterus on the right measuring 3 Hounsfield units. Bilateral tubal ligation clips are noted. GI tract shows no evidence of obstruction or bowel wall thickening. Normal appendix is seen. Uterus is unremarkable. IMPRESSION: 1. Hepatomegaly. 2. Minimal right perinephric inflammation without evidence of obstruction. 3. 3.8 x 3.5 cm cyst in the right posterior pelvis, most likely ovarian. 4. Other findings as described.        Assessment & Plan:       Nicotine abuse  -Nicotine patch  -14mg daily    Pyelonephritis  -Leukocytosis and fever  -Significant leukoesterase and positive nitrites  -Levaquin 75 mg IV daily  -Pain management  -IV fluid hydration  -Abdominal pelvic CT negative for appendicitis or other acute findings  -Urine culture pending  -hCG negative    Code Status: Full    Prophylaxis:  Lovenox 40 mg subcu daily     Electronically Signed : Chepe Ryan, PhD, PA-C, [facility]

## 2021-01-18 NOTE — ROUTINE PROCESS
TRANSFER - IN REPORT:    Verbal report received from saul(name) on Rise Yann  being received from ed(unit) for routine progression of care      Report consisted of patients Situation, Background, Assessment and   Recommendations(SBAR). Information from the following report(s) SBAR was reviewed with the receiving nurse. Opportunity for questions and clarification was provided. Assessment completed upon patients arrival to unit and care assumed.

## 2021-01-18 NOTE — ED NOTES
Per nursing supervisor, patient will be an ED hold for at least 12 hours. Patient placed in hospital bed at this time.

## 2021-01-18 NOTE — ASSESSMENT & PLAN NOTE
-Leukocytosis and fever  -Significant leukoesterase and positive nitrites  -Levaquin 75 mg IV daily  -Pain management  -IV fluid hydration  -Abdominal pelvic CT negative for appendicitis or other acute findings  -Urine culture pending  -hCG negative

## 2021-01-18 NOTE — PROGRESS NOTES
Progress Note    Patient: Amor Oliva MRN: [id number]  SSN: [id number]    YOB: 1990  Age: 35 y.o. Sex: female      Admit Date: 1/17/2021    LOS: 1 day     Subjective:     Patient presents with pyelonephritis. Still with poor appetite. Right flank pain reported. No nausea vomiting. Objective:     Vitals:    01/18/21 0103 01/18/21 0253 01/18/21 0637 01/18/21 0838   BP: 107/72 92/68 (!) 102/57 99/63   Pulse: 89 87 72 82   Resp: 15 16 15 16   Temp: (!) 102.4 °F (39.1 °C) 99.2 °F (37.3 °C) 98.4 °F (36.9 °C) 99.3 °F (37.4 °C)   SpO2: 98% 99% 99% 99%   Weight:       Height:            Intake and Output:  Current Shift: No intake/output data recorded. Last three shifts: No intake/output data recorded. Physical Exam:   GENERAL: alert, cooperative, no distress, appears stated age  THROAT & NECK: normal and no erythema or exudates noted. LUNG: clear to auscultation bilaterally  HEART: regular rate and rhythm, S1, S2 normal, no murmur, click, rub or gallop  ABDOMEN: soft, non-tender. Bowel sounds normal. No masses,  no organomegaly, right costophrenic angle tenderness  EXTREMITIES:  extremities normal, atraumatic, no cyanosis or edema  SKIN: no rash or abnormalities  NEUROLOGIC: AOx3. Gait normal. Reflexes and motor strength normal and symmetric. Cranial nerves 2-12 and sensation grossly intact. PSYCHIATRIC: non focal    Lab/Data Review: All lab results for the last 24 hours reviewed.      Recent Results (from the past 24 hour(s))   URINALYSIS W/ RFLX MICROSCOPIC    Collection Time: 01/17/21  5:30 PM   Result Value Ref Range    Color Yellow/Straw      Appearance Cloudy (A) Clear      Specific gravity 1.020 1.003 - 1.030      pH (UA) 6.0 5.0 - 8.0      Protein 100 (A) Negative mg/dL    Glucose Negative Negative mg/dL    Ketone Negative Negative mg/dL    Bilirubin Negative Negative      Blood Large (A) Negative      Urobilinogen 2.0 (H) 0.2 - 1.0 EU/dL    Nitrites Positive (A) Negative      Leukocyte Esterase Large (A) Negative     HCG URINE, QL    Collection Time: 01/17/21  5:30 PM   Result Value Ref Range    HCG urine, QL Negative Negative     URINE MICROSCOPIC    Collection Time: 01/17/21  5:30 PM   Result Value Ref Range    WBC >100 (H) 0 - 5 /hpf    RBC 0-5 0 - 3 /hpf    Bacteria 4+ (A) Negative /hpf   CBC WITH AUTOMATED DIFF    Collection Time: 01/17/21  6:49 PM   Result Value Ref Range    WBC 16.4 (H) 4.4 - 11.3 K/uL    RBC 3.63 (L) 4.50 - 5.90 M/uL    HGB 9.6 (L) 13.5 - 17.5 g/dL    HCT 30.9 (L) 36 - 46 %    MCV 85.0 80 - 100 FL    MCH 26.4 (L) 31 - 34 PG    MCHC 31.0 31.0 - 36.0 g/dL    RDW 18.5 (H) 11.5 - 14.5 %    PLATELET 615 K/uL    MPV 6.8 6.5 - 11.5 FL    NRBC 0.0  WBC    ABSOLUTE NRBC 0.00 K/uL    NEUTROPHILS 77 (H) 42 - 75 %    LYMPHOCYTES 11 (L) 20.5 - 51.1 %    MONOCYTES 11 (H) 1.7 - 9.3 %    EOSINOPHILS 1 0.9 - 2.9 %    BASOPHILS 0 0.0 - 2.5 %    ABS. NEUTROPHILS 12.6 (H) 1.8 - 7.7 K/UL    ABS. LYMPHOCYTES 1.7 1.0 - 4.8 K/UL    ABS. MONOCYTES 1.9 0.2 - 2.4 K/UL    ABS. EOSINOPHILS 0.2 0.0 - 0.7 K/UL    ABS. BASOPHILS 0.1 0.0 - 0.2 K/UL   METABOLIC PANEL, COMPREHENSIVE    Collection Time: 01/17/21  6:49 PM   Result Value Ref Range    Sodium 139 136 - 145 mmol/L    Potassium 3.5 3.5 - 5.1 mmol/L    Chloride 101 97 - 108 mmol/L    CO2 28 21 - 32 mmol/L    Anion gap 10 5 - 15 mmol/L    Glucose 86 65 - 100 mg/dL    BUN 7 6 - 20 mg/dL    Creatinine 1.13 (H) 0.55 - 1.02 mg/dL    BUN/Creatinine ratio 6 (L) 12 - 20      GFR est AA >60 >60 ml/min/1.73m2    GFR est non-AA 57 (L) >60 ml/min/1.73m2    Calcium 8.2 (L) 8.5 - 10.1 mg/dL    Bilirubin, total 0.7 0.2 - 1.0 mg/dL    AST (SGOT) 12 (L) 15 - 37 U/L    ALT (SGPT) 11 (L) 12 - 78 U/L    Alk.  phosphatase 69 45 - 117 U/L    Protein, total 7.8 6.4 - 8.2 g/dL    Albumin 3.2 (L) 3.5 - 5.0 g/dL    Globulin 4.6 (H) 2.0 - 4.0 g/dL    A-G Ratio 0.7 (L) 1.1 - 2.2     LIPASE    Collection Time: 01/17/21  6:49 PM   Result Value Ref Range    Lipase 106 73 - 983 U/L   METABOLIC PANEL, BASIC    Collection Time: 01/18/21  3:57 AM   Result Value Ref Range    Sodium 139 136 - 145 mmol/L    Potassium 3.2 (L) 3.5 - 5.1 mmol/L    Chloride 105 97 - 108 mmol/L    CO2 23 21 - 32 mmol/L    Anion gap 11 5 - 15 mmol/L    Glucose 100 65 - 100 mg/dL    BUN 8 6 - 20 mg/dL    Creatinine 1.14 (H) 0.55 - 1.02 mg/dL    BUN/Creatinine ratio 7 (L) 12 - 20      GFR est AA >60 >60 ml/min/1.73m2    GFR est non-AA 56 (L) >60 ml/min/1.73m2    Calcium 7.6 (L) 8.5 - 10.1 mg/dL   CBC WITH AUTOMATED DIFF    Collection Time: 01/18/21  3:57 AM   Result Value Ref Range    WBC 16.2 (H) 4.4 - 11.3 K/uL    RBC 3.18 M/uL    HGB 8.6 (L) 13.5 - 17.5 g/dL    HCT 27.0 (L) 36 - 46 %    MCV 84.8 80 - 100 FL    MCH 27.1 (L) 31 - 34 PG    MCHC 31.9 31.0 - 36.0 g/dL    RDW 16.4 (H) 11.5 - 14.5 %    PLATELET 987 K/uL    MPV 7.2 6.5 - 11.5 FL    NEUTROPHILS 74 42 - 75 %    LYMPHOCYTES 14 (L) 20.5 - 51.1 %    MONOCYTES 9 1.7 - 9.3 %    EOSINOPHILS 2 0.9 - 2.9 %    BASOPHILS 1 0.0 - 2.5 %    ABS. NEUTROPHILS 12.0 (H) 1.8 - 7.7 K/UL    ABS. LYMPHOCYTES 2.2 1.0 - 4.8 K/UL    ABS. MONOCYTES 1.4 (H) 0.0 - 0.8 K/UL    ABS. EOSINOPHILS 0.4 0.0 - 0.7 K/UL    ABS. BASOPHILS 0.2 0.0 - 0.2 K/UL        Imaging:    Ct Abd Pelv Wo Cont    Result Date: 1/17/2021  Axial images from lung bases to the pubic symphysis were obtained without contrast. Sagittal and coronal reformatted images were reviewed. All CT scans at this facility are performed using dose reduction optimization techniques as appropriate to a performed exam including the following:   automated exposure control, adjustments of the mA and/or kV according to patient size, or use of iterative reconstruction technique. Comparison with September 10, 2017. INDICATION: Pain. Lung bases are clear. Liver is enlarged measuring 19.6 cm in length. No discrete mass lesions are seen in the liver, spleen, pancreas or adrenal glands. No calcified gallstones.  The abdominal aorta is normal in caliber and contour. No renal calcifications. Minimal right perinephric inflammation. No hydronephrosis or hydroureter. No stones are seen along the course of either ureter. Urinary bladder is poorly distended and appears slightly thick-walled. There is a 3.5 x 3.8 cm cystic mass posterior to uterus on the right measuring 3 Hounsfield units. Bilateral tubal ligation clips are noted. GI tract shows no evidence of obstruction or bowel wall thickening. Normal appendix is seen. Uterus is unremarkable. IMPRESSION: 1. Hepatomegaly. 2. Minimal right perinephric inflammation without evidence of obstruction. 3. 3.8 x 3.5 cm cyst in the right posterior pelvis, most likely ovarian. 4. Other findings as described. Assessment and Plan:     Sepsis  -Patient with fever, tachycardia and leukocytosis meeting sepsis criteria  -Sepsis due to pyelonephritis  -Obtain blood and urine cultures, not done on admission    Pyelonephritis  -Patient with right pyelonephritis, no abscess on CT  -Continue Levaquin, follow urine and blood cultures    GREY  -Likely from sepsis  -Continue IV fluid and monitor renal function    Hypokalemia  -Replace potassium    Anemia  -Check iron studies    Dissipated disposition is 24 to 48 hours pending progress.     Signed By: Pantera oLya MD     January 18, 2021

## 2021-01-18 NOTE — ED PROVIDER NOTES
EMERGENCY DEPARTMENT HISTORY AND PHYSICAL EXAM      Date: 2021  Patient Name: Sima Randall    History of Presenting Illness     Chief Complaint   Patient presents with    Abdominal Pain    Cough       History Provided By: Patient    HPI: Sima Randall, 35 y.o. female with  No significant past medical history who  resents to the ED with cc of right flank pain, fevers, and dysuria x 24 hours. No vomiting, no cough, no bleeding. There are no other complaints, changes, or physical findings at this time. PCP: None    No current facility-administered medications on file prior to encounter. Current Outpatient Medications on File Prior to Encounter   Medication Sig Dispense Refill    [DISCONTINUED] promethazine (PHENERGAN) 25 mg tablet Take 1 Tab by mouth every six (6) hours as needed (Headache). 12 Tab 0       Past History     Past Medical History:  Past Medical History:   Diagnosis Date    Headache        Past Surgical History:  Past Surgical History:   Procedure Laterality Date    HX GYN       section       Family History:  Family History   Problem Relation Age of Onset    Ovarian Cancer Mother     Other Maternal Grandmother         brain aneurysm    Diabetes Paternal Grandmother     Heart Attack Paternal Grandmother     Diabetes Paternal Grandfather     Heart Attack Paternal Grandfather        Social History:  Social History     Tobacco Use    Smoking status: Current Every Day Smoker     Packs/day: 0.25    Smokeless tobacco: Never Used   Substance Use Topics    Alcohol use: No    Drug use: No       Allergies:  No Known Allergies      Review of Systems     Review of Systems   Constitutional: Negative. HENT: Negative. Eyes: Negative. Respiratory: Negative. Cardiovascular: Negative. Gastrointestinal: Negative. Endocrine: Negative. Genitourinary: Negative. Musculoskeletal: Negative. Skin: Negative. Allergic/Immunologic: Negative. Neurological: Negative. Hematological: Negative. Psychiatric/Behavioral: Negative. Physical Exam  Vitals signs and nursing note reviewed. Constitutional:       Appearance: She is well-developed. HENT:      Head: Normocephalic and atraumatic. Eyes:      Extraocular Movements: Extraocular movements intact. Pupils: Pupils are equal, round, and reactive to light. Cardiovascular:      Rate and Rhythm: Normal rate and regular rhythm. Heart sounds: Normal heart sounds. Pulmonary:      Effort: Pulmonary effort is normal.      Breath sounds: Normal breath sounds. Abdominal:      General: Abdomen is flat and scaphoid. Bowel sounds are increased. Palpations: Abdomen is soft. Tenderness: There is abdominal tenderness in the suprapubic area. There is right CVA tenderness. Skin:     General: Skin is warm and dry. Neurological:      General: No focal deficit present. Mental Status: She is alert and oriented to person, place, and time.    Psychiatric:         Mood and Affect: Mood normal.         Behavior: Behavior normal.         Lab and Diagnostic Study Results     Labs -     Recent Results (from the past 12 hour(s))   URINALYSIS W/ RFLX MICROSCOPIC    Collection Time: 01/17/21  5:30 PM   Result Value Ref Range    Color Yellow/Straw      Appearance Cloudy (A) Clear      Specific gravity 1.020 1.003 - 1.030      pH (UA) 6.0 5.0 - 8.0      Protein 100 (A) Negative mg/dL    Glucose Negative Negative mg/dL    Ketone Negative Negative mg/dL    Bilirubin Negative Negative      Blood Large (A) Negative      Urobilinogen 2.0 (H) 0.2 - 1.0 EU/dL    Nitrites Positive (A) Negative      Leukocyte Esterase Large (A) Negative     HCG URINE, QL    Collection Time: 01/17/21  5:30 PM   Result Value Ref Range    HCG urine, QL Negative Negative     URINE MICROSCOPIC    Collection Time: 01/17/21  5:30 PM   Result Value Ref Range    WBC >100 (H) 0 - 5 /hpf    RBC 0-5 0 - 3 /hpf    Bacteria 4+ (A) Negative /hpf   CBC WITH AUTOMATED DIFF    Collection Time: 01/17/21  6:49 PM   Result Value Ref Range    WBC 16.4 (H) 4.4 - 11.3 K/uL    RBC 3.63 (L) 4.50 - 5.90 M/uL    HGB 9.6 (L) 13.5 - 17.5 g/dL    HCT 30.9 (L) 36 - 46 %    MCV 85.0 80 - 100 FL    MCH 26.4 (L) 31 - 34 PG    MCHC 31.0 31.0 - 36.0 g/dL    RDW 18.5 (H) 11.5 - 14.5 %    PLATELET 990 K/uL    MPV 6.8 6.5 - 11.5 FL    NRBC 0.0  WBC    ABSOLUTE NRBC 0.00 K/uL    NEUTROPHILS 77 (H) 42 - 75 %    LYMPHOCYTES 11 (L) 20.5 - 51.1 %    MONOCYTES 11 (H) 1.7 - 9.3 %    EOSINOPHILS 1 0.9 - 2.9 %    BASOPHILS 0 0.0 - 2.5 %    ABS. NEUTROPHILS 12.6 (H) 1.8 - 7.7 K/UL    ABS. LYMPHOCYTES 1.7 1.0 - 4.8 K/UL    ABS. MONOCYTES 1.9 0.2 - 2.4 K/UL    ABS. EOSINOPHILS 0.2 0.0 - 0.7 K/UL    ABS. BASOPHILS 0.1 0.0 - 0.2 K/UL   METABOLIC PANEL, COMPREHENSIVE    Collection Time: 01/17/21  6:49 PM   Result Value Ref Range    Sodium 139 136 - 145 mmol/L    Potassium 3.5 3.5 - 5.1 mmol/L    Chloride 101 97 - 108 mmol/L    CO2 28 21 - 32 mmol/L    Anion gap 10 5 - 15 mmol/L    Glucose 86 65 - 100 mg/dL    BUN 7 6 - 20 mg/dL    Creatinine 1.13 (H) 0.55 - 1.02 mg/dL    BUN/Creatinine ratio 6 (L) 12 - 20      GFR est AA >60 >60 ml/min/1.73m2    GFR est non-AA 57 (L) >60 ml/min/1.73m2    Calcium 8.2 (L) 8.5 - 10.1 mg/dL    Bilirubin, total 0.7 0.2 - 1.0 mg/dL    AST (SGOT) 12 (L) 15 - 37 U/L    ALT (SGPT) 11 (L) 12 - 78 U/L    Alk. phosphatase 69 45 - 117 U/L    Protein, total 7.8 6.4 - 8.2 g/dL    Albumin 3.2 (L) 3.5 - 5.0 g/dL    Globulin 4.6 (H) 2.0 - 4.0 g/dL    A-G Ratio 0.7 (L) 1.1 - 2.2     LIPASE    Collection Time: 01/17/21  6:49 PM   Result Value Ref Range    Lipase 106 73 - 393 U/L       Radiologic Studies -   @[email or web address]@  CT Results  (Last 48 hours)               01/17/21 1903  CT ABD PELV WO CONT Final result    Impression:  IMPRESSION:   1. Hepatomegaly. 2. Minimal right perinephric inflammation without evidence of obstruction.    3. 3.8 x 3.5 cm cyst in the right posterior pelvis, most likely ovarian. 4. Other findings as described. Narrative:  Axial images from lung bases to the pubic symphysis were obtained without   contrast. Sagittal and coronal reformatted images were reviewed. All CT scans at   this facility are performed using dose reduction optimization techniques as   appropriate to a performed exam including the following:   automated exposure   control, adjustments of the mA and/or kV according to patient size, or use of   iterative reconstruction technique. Comparison with September 10, 2017. INDICATION: Pain. Lung bases are clear. Liver is enlarged measuring 19.6 cm in length. No discrete   mass lesions are seen in the liver, spleen, pancreas or adrenal glands. No   calcified gallstones. The abdominal aorta is normal in caliber and contour. No   renal calcifications. Minimal right perinephric inflammation. No hydronephrosis   or hydroureter. No stones are seen along the course of either ureter. Urinary   bladder is poorly distended and appears slightly thick-walled. There is a 3.5 x   3.8 cm cystic mass posterior to uterus on the right measuring 3 Hounsfield   units. Bilateral tubal ligation clips are noted. GI tract shows no evidence of   obstruction or bowel wall thickening. Normal appendix is seen. Uterus is   unremarkable. CXR Results  (Last 48 hours)    None            Medical Decision Making   - I am the first provider for this patient. - I reviewed the vital signs, available nursing notes, past medical history, past surgical history, family history and social history. - Initial assessment performed. The patients presenting problems have been discussed, and they are in agreement with the care plan formulated and outlined with them. I have encouraged them to ask questions as they arise throughout their visit. Vital Signs-Reviewed the patient's vital signs.   Patient Vitals for the past 12 hrs:   Temp Pulse Resp BP SpO2   01/17/21 1924 (!) 100.6 °F (38.1 °C) 92 16 103/67 98 %   01/17/21 1843 -- -- -- -- 100 %   01/17/21 1728 99.7 °F (37.6 °C) (!) 104 18 102/70 100 %       Records Reviewed: Nursing Notes    The patient presents with back pain with a differential diagnosis of  kidney stone, lumbar strain, pneumonia, pyelonephritis, shingles, traumatic injury and viral syndrome      ED Course:  Patient noted to a fever, right flank pain 100 wbc's, 4 + bacteria and perinephric stranding on CT Scan of Abdomen/Pelvis consistent with acute pyelonephritis          Provider Notes (Medical Decision Making): Patient noted to a fever, right flank pain 100 wbc's, 4 + bacteria and perinephric stranding on CT Scan of Abdomen/Pelvis consistent with acute pyelonephritis       MDM   Discussed case with Hospitalist Mr. Laura Alaniz [place] who has agreed to admit patient for management of her condition    Procedures   Medical Decision Makingedical Decision Making  Performed by: Angely Brown MD  PROCEDURES:  None  Procedures       Disposition   Disposition: Patient is being admitted pyelonephritis   Admitted    DISCHARGE PLAN:  1. There are no discharge medications for this patient. 2.   Follow-up Information    None       3. Return to ED if worse   4. There are no discharge medications for this patient. Diagnosis     Clinical Impression:   1. Pyelonephritis        Attestations:    Angely Brown MD    Please note that this dictation was completed with [employer], the computer voice recognition software. Quite often unanticipated grammatical, syntax, homophones, and other interpretive errors are inadvertently transcribed by the computer software. Please disregard these errors. Please excuse any errors that have escaped final proofreading. Thank you.

## 2021-01-18 NOTE — ED NOTES
Pt proved morning medications and breakfast tray. Pt complaining of pain, pain medication given. No acute distress noted. Will continue to monitor.

## 2021-01-18 NOTE — PROGRESS NOTES
PT not seen per physician request as Dr. Alize Dasilva stated pt was not a candidate for rehab at this time.

## 2021-01-19 VITALS
SYSTOLIC BLOOD PRESSURE: 107 MMHG | WEIGHT: 162.48 LBS | DIASTOLIC BLOOD PRESSURE: 66 MMHG | RESPIRATION RATE: 18 BRPM | BODY MASS INDEX: 25.5 KG/M2 | TEMPERATURE: 98.1 F | OXYGEN SATURATION: 98 % | HEIGHT: 67 IN | HEART RATE: 66 BPM

## 2021-01-19 LAB
ANION GAP SERPL CALC-SCNC: 6 MMOL/L (ref 5–15)
BASOPHILS # BLD: 0.1 K/UL (ref 0–0.2)
BASOPHILS NFR BLD: 1 % (ref 0–2.5)
BUN SERPL-MCNC: 7 MG/DL (ref 6–20)
BUN/CREAT SERPL: 8 (ref 12–20)
CA-I BLD-MCNC: 7.8 MG/DL (ref 8.5–10.1)
CHLORIDE SERPL-SCNC: 106 MMOL/L (ref 97–108)
CO2 SERPL-SCNC: 25 MMOL/L (ref 21–32)
CREAT SERPL-MCNC: 0.87 MG/DL (ref 0.55–1.02)
EOSINOPHIL # BLD: 0.4 K/UL (ref 0–0.7)
EOSINOPHIL NFR BLD: 4 % (ref 0.9–2.9)
ERYTHROCYTE [DISTWIDTH] IN BLOOD BY AUTOMATED COUNT: 17.9 % (ref 11.5–14.5)
GLUCOSE SERPL-MCNC: 121 MG/DL (ref 65–100)
HCT VFR BLD AUTO: 28.4 % (ref 36–46)
HGB BLD-MCNC: 8.7 G/DL (ref 13.5–17.5)
LYMPHOCYTES # BLD: 1.9 K/UL (ref 1–4.8)
LYMPHOCYTES NFR BLD: 22 % (ref 20.5–51.1)
MCH RBC QN AUTO: 26.4 PG (ref 31–34)
MCHC RBC AUTO-ENTMCNC: 30.6 G/DL (ref 31–36)
MCV RBC AUTO: 86.3 FL (ref 80–100)
MONOCYTES # BLD: 1.2 K/UL (ref 0.2–2.4)
MONOCYTES NFR BLD: 14 % (ref 1.7–9.3)
NEUTS SEG # BLD: 5 K/UL (ref 1.8–7.7)
NEUTS SEG NFR BLD: 59 % (ref 42–75)
NRBC # BLD: 0 K/UL
NRBC BLD-RTO: 0 PER 100 WBC
PLATELET # BLD AUTO: 395 K/UL
PMV BLD AUTO: 7.5 FL (ref 6.5–11.5)
POTASSIUM SERPL-SCNC: 3.7 MMOL/L (ref 3.5–5.1)
RBC # BLD AUTO: 3.29 M/UL (ref 4.5–5.9)
SODIUM SERPL-SCNC: 137 MMOL/L (ref 136–145)
WBC # BLD AUTO: 8.6 K/UL (ref 4.4–11.3)

## 2021-01-19 PROCEDURE — 85025 COMPLETE CBC W/AUTO DIFF WBC: CPT

## 2021-01-19 PROCEDURE — 74011250636 HC RX REV CODE- 250/636: Performed by: PHYSICIAN ASSISTANT

## 2021-01-19 PROCEDURE — 80048 BASIC METABOLIC PNL TOTAL CA: CPT

## 2021-01-19 PROCEDURE — G0378 HOSPITAL OBSERVATION PER HR: HCPCS

## 2021-01-19 PROCEDURE — 74011250637 HC RX REV CODE- 250/637: Performed by: PHYSICIAN ASSISTANT

## 2021-01-19 PROCEDURE — 74011250637 HC RX REV CODE- 250/637: Performed by: FAMILY MEDICINE

## 2021-01-19 PROCEDURE — 96372 THER/PROPH/DIAG INJ SC/IM: CPT

## 2021-01-19 PROCEDURE — 36415 COLL VENOUS BLD VENIPUNCTURE: CPT

## 2021-01-19 RX ORDER — LEVOFLOXACIN 500 MG/1
500 TABLET, FILM COATED ORAL DAILY
Qty: 5 TAB | Refills: 0 | Status: SHIPPED | OUTPATIENT
Start: 2021-01-19 | End: 2021-01-24

## 2021-01-19 RX ADMIN — POTASSIUM CHLORIDE 20 MEQ: 1500 TABLET, EXTENDED RELEASE ORAL at 09:13

## 2021-01-19 RX ADMIN — ENOXAPARIN SODIUM 40 MG: 40 INJECTION SUBCUTANEOUS at 09:16

## 2021-01-19 RX ADMIN — FAMOTIDINE 20 MG: 20 TABLET ORAL at 09:13

## 2021-01-19 RX ADMIN — GUAIFENESIN 600 MG: 600 TABLET, EXTENDED RELEASE ORAL at 09:13

## 2021-01-19 RX ADMIN — SODIUM CHLORIDE 125 ML/HR: 9 INJECTION, SOLUTION INTRAVENOUS at 06:41

## 2021-01-19 NOTE — DISCHARGE SUMMARY
Discharge Summary       PATIENT ID: Alvera Cranker  MRN: [id number]   YOB: 1990    DATE OF ADMISSION: 1/17/2021  5:25 PM    DATE OF DISCHARGE: 01/19/2021   PRIMARY CARE PROVIDER: None     ATTENDING PHYSICIAN: Glennis Dance  DISCHARGING PROVIDER: Juan Conn MD    To contact this individual call [phone] and ask the  to page. If unavailable ask to be transferred the Adult Hospitalist Department. CONSULTATIONS: None    PROCEDURES/SURGERIES: * No surgery found *    ADMITTING DIAGNOSES & HOSPITAL COURSE:     HPI  Alvera Cranker is a 35 y.o. female  has a past medical history of Headache. She also has no past medical history of Asthma, Diabetes ([address] or Heart abnormality.    Patient presents with right lower quadrant pain vomiting, urinary frequency and urgency along with pain with urination. Complaints began about 48 hours prior to arrival.  Patient takes no medications on a daily basis and otherwise healthy except for intermittent migraines.   Patient examined and found to be in no acute distress is more flank pain is right lower quadrant pain. Is any vaginal discharge or related GYN symptoms. Patient will be admitted to acute care hospital service expect length of stay of 2-3 midnight. Hospital Course  Presents with sepsis, initially with fever, tachycardia and leukocytosis. Sepsis due to pyelonephritis. CT of the abdomen and pelvis showing right-sided perinephric stranding. Patient's leukocytosis resolved and fever is improved. Patient was treated with IV Levaquin and will be continuing on p.o. Levaquin to complete the course. DISCHARGE DIAGNOSES / PLAN:      1. Sepsis  2. Pyelonephritis  3. GREY  4. Hypokalemia  5.  Anemia     ADDITIONAL CARE RECOMMENDATIONS:     None     PENDING TEST RESULTS:   At the time of discharge the following test results are still pending: None    FOLLOW UP APPOINTMENTS:    Follow-up Information     Follow up With Specialties Details Why Contact Info Ashely Petersen PA-C Physician Assistant On 1/25/2021 @ 2:30 5 [address]  [address]  [phone]      None    None (395) Patient stated that they have no PCP               DIET: Regular Diet  Oral Nutritional Supplements: No Oral Supplement prescribed    ACTIVITY: Activity as tolerated    WOUND CARE: None    EQUIPMENT needed: None      DISCHARGE MEDICATIONS:  Current Discharge Medication List      START taking these medications    Details   levoFLOXacin (Levaquin) 500 mg tablet Take 1 Tab by mouth daily for 5 days. Qty: 5 Tab, Refills: 0               NOTIFY YOUR PHYSICIAN FOR ANY OF THE FOLLOWING:   Fever over 101 degrees for 24 hours. Chest pain, shortness of breath, fever, chills, nausea, vomiting, diarrhea, change in mentation, falling, weakness, bleeding. Severe pain or pain not relieved by medications. Or, any other signs or symptoms that you may have questions about. DISPOSITION:    Home With:   OT  PT  HH  RN       Long term SNF/Inpatient Rehab    Independent/assisted living    Hospice    Other:       PATIENT CONDITION AT DISCHARGE:     Functional status    Poor     Deconditioned     Independent      Cognition     Lucid     Forgetful     Dementia      Catheters/lines (plus indication)    [place]     PICC     PEG     None      Code status     Full code     DNR      PHYSICAL EXAMINATION AT DISCHARGE:  General:          Alert, cooperative, no distress, appears stated age. HEENT:           Atraumatic, anicteric sclerae, pink conjunctivae                          No oral ulcers, mucosa moist, throat clear, dentition fair  Neck:               Supple, symmetrical  Lungs:             Clear to auscultation bilaterally. No Wheezing or Rhonchi. No rales. Chest wall:      No tenderness  No Accessory muscle use. Heart:              Regular  rhythm,  No  murmur   No edema  Abdomen:        Soft, non-tender. Not distended. Bowel sounds normal  Extremities:     No cyanosis.   No clubbing, Skin turgor normal, Capillary refill normal  Skin:                Not pale. Not Jaundiced  No rashes   Psych:             Not anxious or agitated.   Neurologic:      Alert, moves all extremities, answers questions appropriately and responds to commands       CHRONIC MEDICAL DIAGNOSES:  Problem List as of 1/19/2021 Date Reviewed: 1/17/2021          Codes Class Noted - Resolved    * (Principal) Pyelonephritis ICD-10-CM: N12  ICD-9-CM: 590.80  1/17/2021 - Present        Nicotine abuse ICD-10-CM: Z72.0  ICD-9-CM: 305.1  1/17/2021 - Present        Pregnancy ICD-10-CM: Z34.90  ICD-9-CM: V22.2  7/14/2016 - Present              Greater than 60 minutes were spent with the patient on counseling and coordination of care    Signed:   Daniel Ramirez MD  1/19/2021  12:19 PM

## 2021-01-19 NOTE — DISCHARGE INSTRUCTIONS
Patient Education   activity as tolerated  Follow up as directed          Patient Education        Kidney Infection: Care Instructions  Your Care Instructions     A kidney infection (pyelonephritis) is a type of urinary tract infection, or UTI. Most UTIs are bladder infections. Kidney infections tend to make people much sicker than bladder infections do. A kidney infection is also more serious because it can cause lasting damage if it is not treated quickly. Follow-up care is a key part of your treatment and safety. Be sure to make and go to all appointments, and call your doctor if you are having problems. It's also a good idea to know your test results and keep a list of the medicines you take. How can you care for yourself at home? · Take your antibiotics as directed. Do not stop taking them just because you feel better. You need to take the full course of antibiotics. · Drink plenty of water, enough so that your urine is light yellow or clear like water. This may help wash out bacteria that are causing the infection. If you have kidney, heart, or liver disease and have to limit fluids, talk with your doctor before you increase the amount of fluids you drink. · Urinate often. Try to empty your bladder each time. · To relieve pain, take a hot shower or lay a heating pad (set on low) over your lower belly. Never go to sleep with a heating pad in place. Put a thin cloth between the heating pad and your skin. To help prevent kidney infections  · Drink plenty of water each day. This helps you urinate often, which clears bacteria from your system. If you have kidney, heart, or liver disease and have to limit fluids, talk with your doctor before you increase the amount of fluids you drink. · Urinate when you have the urge. Do not hold your urine for a long time. Urinate before you go to sleep.   · If you have symptoms of a bladder infection, such as burning when you urinate or having to urinate often, call your doctor so you can treat the problem before it gets worse. If you do not treat a bladder infection quickly, it can spread to the kidney. · Men should keep the tip of the penis clean. If you are a woman, keep these ideas in mind:  · Urinate right after you have sex. · Change sanitary pads often. Avoid douches, feminine hygiene sprays, and other feminine hygiene products that have deodorants. · After going to the bathroom, wipe from front to back. When should you call for help? Call your doctor now or seek immediate medical care if:    · You have symptoms that a kidney infection is getting worse. These may include:  ? Pain or burning when you urinate. ? A frequent need to urinate without being able to pass much urine. ? Pain in the flank, which is just below the rib cage and above the waist on either side of the back. ? Blood in the urine. ? A fever.     · You are vomiting or nauseated. Watch closely for changes in your health, and be sure to contact your doctor if:    · You do not get better as expected. Where can you learn more? Go to http://www.gray.com/  Enter U674 in the search box to learn more about \"Kidney Infection: Care Instructions. \"  Current as of: April 15, 2020               Content Version: 12.6  © 5874-2914 [employer]. Care instructions adapted under license by [employer] (which disclaims liability or warranty for this information). If you have questions about a medical condition or this instruction, always ask your healthcare professional. [place] [address] any warranty or liability for your use of this information.

## 2021-01-19 NOTE — ROUTINE PROCESS
Awake since 1900. IV had infiltrated in LAC and new sited in left lower arm. Non prod cough. States the pain in the right side stays but hurts more when coughing, eating, or moving.

## 2021-01-20 LAB
BACTERIA SPEC CULT: ABNORMAL
COLONY COUNT,CNT: ABNORMAL
SPECIAL REQUESTS,SREQ: ABNORMAL

## 2021-01-21 NOTE — PROGRESS NOTES
Discussed with Belem Vasquez, from lab. She reviewed the blood cultures and reported there is still no growth. I will check back with her again tomorrow as well as follow up the blood cultures result again.

## 2021-01-24 LAB
BACTERIA SPEC CULT: NORMAL
SPECIAL REQUESTS,SREQ: NORMAL

## 2021-04-19 ENCOUNTER — HOSPITAL ENCOUNTER (EMERGENCY)
Age: 31
Discharge: HOME | End: 2021-04-19
Attending: EMERGENCY MEDICINE
Payer: MEDICAID

## 2021-04-19 VITALS
HEART RATE: 66 BPM | SYSTOLIC BLOOD PRESSURE: 132 MMHG | HEIGHT: 67 IN | TEMPERATURE: 99.2 F | BODY MASS INDEX: 22.44 KG/M2 | RESPIRATION RATE: 16 BRPM | OXYGEN SATURATION: 99 % | WEIGHT: 143 LBS | DIASTOLIC BLOOD PRESSURE: 89 MMHG

## 2021-04-19 DIAGNOSIS — J02.9 PHARYNGITIS, UNSPECIFIED ETIOLOGY: Primary | ICD-10-CM

## 2021-04-19 LAB
FLUAV AG NPH QL IA: NEGATIVE
FLUBV AG NOSE QL IA: NEGATIVE

## 2021-04-19 PROCEDURE — 96372 THER/PROPH/DIAG INJ SC/IM: CPT

## 2021-04-19 PROCEDURE — 74011000250 HC RX REV CODE- 250: Performed by: EMERGENCY MEDICINE

## 2021-04-19 PROCEDURE — 87804 INFLUENZA ASSAY W/OPTIC: CPT

## 2021-04-19 PROCEDURE — 99282 EMERGENCY DEPT VISIT SF MDM: CPT

## 2021-04-19 PROCEDURE — U0003 INFECTIOUS AGENT DETECTION BY NUCLEIC ACID (DNA OR RNA); SEVERE ACUTE RESPIRATORY SYNDROME CORONAVIRUS 2 (SARS-COV-2) (CORONAVIRUS DISEASE [COVID-19]), AMPLIFIED PROBE TECHNIQUE, MAKING USE OF HIGH THROUGHPUT TECHNOLOGIES AS DESCRIBED BY CMS-2020-01-R: HCPCS

## 2021-04-19 PROCEDURE — 74011250636 HC RX REV CODE- 250/636: Performed by: EMERGENCY MEDICINE

## 2021-04-19 RX ORDER — AMOXICILLIN 500 MG/1
500 TABLET, FILM COATED ORAL 3 TIMES DAILY
Qty: 30 TAB | Refills: 0 | Status: SHIPPED | OUTPATIENT
Start: 2021-04-19

## 2021-04-19 RX ADMIN — CEFTRIAXONE SODIUM 1 G: 1 INJECTION, POWDER, FOR SOLUTION INTRAMUSCULAR; INTRAVENOUS at 02:31

## 2021-04-19 NOTE — ED PROVIDER NOTES
Patient presents with complaint of sore throat , congestion and malaise for 1 week. No fever, chills , nausea or vomiting.  She wants to be tested for Covid            Past Medical History:   Diagnosis Date    Headache        Past Surgical History:   Procedure Laterality Date    HX GYN       section         Family History:   Problem Relation Age of Onset    Ovarian Cancer Mother     Other Maternal Grandmother         brain aneurysm    Diabetes Paternal Grandmother     Heart Attack Paternal Grandmother     Diabetes Paternal Grandfather     Heart Attack Paternal Grandfather        Social History     Socioeconomic History    Marital status: SINGLE     Spouse name: Not on file    Number of children: Not on file    Years of education: Not on file    Highest education level: Not on file   Occupational History    Not on file   Social Needs    Financial resource strain: Not on file    Food insecurity     Worry: Not on file     Inability: Not on file    Transportation needs     Medical: Not on file     Non-medical: Not on file   Tobacco Use    Smoking status: Current Every Day Smoker     Packs/day: 0.25    Smokeless tobacco: Never Used   Substance and Sexual Activity    Alcohol use: No    Drug use: No    Sexual activity: Yes     Partners: Male, Female   Lifestyle    Physical activity     Days per week: Not on file     Minutes per session: Not on file    Stress: Not on file   Relationships    Social connections     Talks on phone: Not on file     Gets together: Not on file     Attends Taoist service: Not on file     Active member of club or organization: Not on file     Attends meetings of clubs or organizations: Not on file     Relationship status: Not on file    Intimate partner violence     Fear of current or ex partner: Not on file     Emotionally abused: Not on file     Physically abused: Not on file     Forced sexual activity: Not on file   Other Topics Concern    Not on file   Social History Narrative    Not on file         ALLERGIES: Patient has no known allergies. Review of Systems   Constitutional: Negative. Negative for fever. HENT: Positive for congestion and sore throat. Eyes: Negative. Respiratory: Negative. Negative for shortness of breath. Cardiovascular: Negative. Gastrointestinal: Negative. Negative for nausea and vomiting. Endocrine: Negative. Genitourinary: Negative. Skin: Negative. Allergic/Immunologic: Negative. Neurological: Negative. Hematological: Negative. Psychiatric/Behavioral: Negative. All other systems reviewed and are negative. Vitals:    04/19/21 0206   BP: 134/87   Pulse: 61   Resp: 16   Temp: 99.1 °F (37.3 °C)   SpO2: 99%   Weight: 64.9 kg (143 lb)   Height: 5' 7\" (1.702 m)            Physical Exam  Vitals signs and nursing note reviewed. Constitutional:       Appearance: She is well-developed. HENT:      Head: Normocephalic and atraumatic. Mouth/Throat:      Comments: Posterior pharynx erythematous  Neck:      Musculoskeletal: Normal range of motion and neck supple. Cardiovascular:      Rate and Rhythm: Normal rate and regular rhythm. Heart sounds: Normal heart sounds. Pulmonary:      Breath sounds: Normal breath sounds. Abdominal:      General: Bowel sounds are normal.      Palpations: Abdomen is soft. Musculoskeletal: Normal range of motion. Neurological:      General: No focal deficit present. Mental Status: She is alert.    Psychiatric:         Mood and Affect: Mood normal.         Behavior: Behavior normal.          MDM  Number of Diagnoses or Management Options  Risk of Complications, Morbidity, and/or Mortality  Presenting problems: moderate  Diagnostic procedures: low  Management options: low           Procedures

## 2021-04-19 NOTE — ED NOTES
Pt was educated and given discharge instructions, understanding was verbalized. Pt ambulated out of ED with all belongings.

## 2021-04-19 NOTE — ED TRIAGE NOTES
Patient presents to ED reporting she has had a headache, body aches, fatigue, cough, and congestion for 1 day. Patient states, \"I just want to know if I have COVID-19 and for ya'll to put me on quarantine from work until you get the results. \"

## 2021-04-19 NOTE — Clinical Note
[address] 
[facility] EMERGENCY DEPT 
[address] 
[phone] Work/School Note Date: 4/19/2021 To Whom It May concern: 
 
Earnestine Brand was evaulated by the following provider(s): 
Attending Provider: Evelio Navarro [address] [place] [address] virus is suspected. Per the CDC guidelines we recommend home isolation until the following conditions are all met: 1. At least 10 days have passed since symptoms first appeared and 2. At least 24 hours have passed since last fever without the use of fever-reducing medications and 
3. Symptoms (e.g., cough, shortness of breath) have improved Sincerely, 
 
 
 
 
Andi Hubbard MD

## 2021-04-20 LAB — SARS-COV-2, COV2NT: NOT DETECTED

## 2022-03-19 PROBLEM — Z72.0 NICOTINE ABUSE: Status: ACTIVE | Noted: 2021-01-17

## 2022-03-19 PROBLEM — N12 PYELONEPHRITIS: Status: ACTIVE | Noted: 2021-01-17

## 2023-12-07 ENCOUNTER — NURSE TRIAGE (OUTPATIENT)
Dept: OTHER | Facility: CLINIC | Age: 33
End: 2023-12-07
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
## 2023-12-10 PROBLEM — N12 PYELONEPHRITIS: Status: RESOLVED | Noted: 2021-01-17 | Resolved: 2023-12-10

## 2023-12-13 PROBLEM — O30.009 TWIN PREGNANCY: Status: ACTIVE | Noted: 2017-04-11

## 2023-12-13 PROBLEM — N12 PYELONEPHRITIS: Status: ACTIVE | Noted: 2021-01-17

## 2024-04-23 ENCOUNTER — HOSPITAL ENCOUNTER (EMERGENCY)
Facility: HOSPITAL | Age: 34
Discharge: HOME | End: 2024-04-23
Attending: EMERGENCY MEDICINE
Payer: MEDICAID

## 2024-04-23 ENCOUNTER — APPOINTMENT (OUTPATIENT)
Facility: HOSPITAL | Age: 34
End: 2024-04-23
Payer: MEDICAID

## 2024-04-23 VITALS
SYSTOLIC BLOOD PRESSURE: 152 MMHG | OXYGEN SATURATION: 100 % | TEMPERATURE: 98.3 F | RESPIRATION RATE: 16 BRPM | DIASTOLIC BLOOD PRESSURE: 96 MMHG | HEART RATE: 81 BPM

## 2024-04-23 DIAGNOSIS — R05.9 COUGH, UNSPECIFIED TYPE: ICD-10-CM

## 2024-04-23 DIAGNOSIS — R06.2 WHEEZING: Primary | ICD-10-CM

## 2024-04-23 DIAGNOSIS — F17.200 SMOKER: ICD-10-CM

## 2024-04-23 LAB
FLUAV RNA SPEC QL NAA+PROBE: NOT DETECTED
FLUBV RNA SPEC QL NAA+PROBE: NOT DETECTED
SARS-COV-2 RNA RESP QL NAA+PROBE: NOT DETECTED

## 2024-04-23 PROCEDURE — 71045 X-RAY EXAM CHEST 1 VIEW: CPT

## 2024-04-23 PROCEDURE — 87636 SARSCOV2 & INF A&B AMP PRB: CPT

## 2024-04-23 PROCEDURE — 94640 AIRWAY INHALATION TREATMENT: CPT

## 2024-04-23 PROCEDURE — 6370000000 HC RX 637 (ALT 250 FOR IP): Performed by: EMERGENCY MEDICINE

## 2024-04-23 PROCEDURE — 99284 EMERGENCY DEPT VISIT MOD MDM: CPT

## 2024-04-23 RX ORDER — PREDNISONE 20 MG/1
40 TABLET ORAL
Status: COMPLETED | OUTPATIENT
Start: 2024-04-23 | End: 2024-04-23

## 2024-04-23 RX ORDER — IPRATROPIUM BROMIDE AND ALBUTEROL SULFATE 2.5; .5 MG/3ML; MG/3ML
1 SOLUTION RESPIRATORY (INHALATION)
Status: COMPLETED | OUTPATIENT
Start: 2024-04-23 | End: 2024-04-23

## 2024-04-23 RX ORDER — ALBUTEROL SULFATE 90 UG/1
2 AEROSOL, METERED RESPIRATORY (INHALATION) 4 TIMES DAILY PRN
Qty: 54 G | Refills: 1 | Status: SHIPPED | OUTPATIENT
Start: 2024-04-23

## 2024-04-23 RX ORDER — METHYLPREDNISOLONE 4 MG/1
TABLET ORAL
Qty: 1 KIT | Refills: 0 | Status: SHIPPED | OUTPATIENT
Start: 2024-04-23 | End: 2024-04-29

## 2024-04-23 RX ADMIN — PREDNISONE 40 MG: 20 TABLET ORAL at 15:38

## 2024-04-23 RX ADMIN — IPRATROPIUM BROMIDE AND ALBUTEROL SULFATE 1 DOSE: 2.5; .5 SOLUTION RESPIRATORY (INHALATION) at 15:17

## 2024-04-23 RX ADMIN — IPRATROPIUM BROMIDE AND ALBUTEROL SULFATE 1 DOSE: 2.5; .5 SOLUTION RESPIRATORY (INHALATION) at 15:02

## 2024-04-23 ASSESSMENT — PAIN - FUNCTIONAL ASSESSMENT: PAIN_FUNCTIONAL_ASSESSMENT: 0-10

## 2024-04-23 ASSESSMENT — PAIN SCALES - GENERAL: PAINLEVEL_OUTOF10: 0

## 2024-04-23 NOTE — ED TRIAGE NOTES
PT reports wheezing and a productive cough. PT reports symptoms began 1 week ago. PT in NAD. Smokes a pack of cigarettes a day.

## 2024-04-23 NOTE — ED PROVIDER NOTES
[facility] EMERGENCY DEPT  EMERGENCY DEPARTMENT HISTORY AND PHYSICAL EXAM      Date: 2024  Patient Name: Francesco Yo  MRN: [id number]  YOB: 1990  Date of evaluation: 2024  Provider: Stephanie Nath MD   Note Started: 4:20 PM EDT 24    HISTORY OF PRESENT ILLNESS     Chief Complaint   Patient presents with    Wheezing       History Provided By: Patient    HPI: Francesco Yo is a 33 y.o. female presents ambulatory to the ED with complaints of wheezing and productive cough for about 1 week.  Patient has not had any symptoms in over a year.  She does have some seasonal allergies but does not have them trigger this in the past.  She has been on an inhaler in the past.  Patient is a daily smoker    PAST MEDICAL HISTORY   Past Medical History:  Past Medical History:   Diagnosis Date    Headache        Past Surgical History:  Past Surgical History:   Procedure Laterality Date    GYN       section, tubal ligation       Family History:  Family History   Problem Relation Age of Onset    Heart Attack Paternal Grandfather     Diabetes Paternal Grandfather     Heart Attack Paternal Grandmother     Diabetes Paternal Grandmother     Other Maternal Grandmother         brain aneurysm    Ovarian Cancer Mother        Social History:  Social History     Tobacco Use    Smoking status: Every Day     Current packs/day: 0.25     Types: Cigarettes    Smokeless tobacco: Never   Substance Use Topics    Alcohol use: No    Drug use: No       Allergies:  No Known Allergies    PCP: No primary care provider on file.    Current Meds:   No current facility-administered medications for this encounter.     Current Outpatient Medications   Medication Sig Dispense Refill    albuterol sulfate HFA (VENTOLIN HFA) 108 (90 Base) MCG/ACT inhaler Inhale 2 puffs into the lungs 4 times daily as needed for Wheezing 54 g 1    methylPREDNISolone (MEDROL, MARY,) 4 MG tablet Take by mouth. 1 kit 0    sucralfate (CARAFATE) 1 GM tablet Take 1 tablet

## 2024-04-23 NOTE — DISCHARGE INSTRUCTIONS
Thank you!  Thank you for allowing me to care for you in the emergency department. It is my goal to provide you with excellent care.  Please fill out the survey that will come to you by mail or email since we listen to your feedback!     Below you will find a list of your tests from today's visit.  Should you have any questions, please do not hesitate to call the emergency department.    Labs  Recent Results (from the past 12 hour(s))   COVID-19 & Influenza Combo    Collection Time: 04/23/24  3:00 PM    Specimen: Nasopharyngeal   Result Value Ref Range    SARS-CoV-2, PCR Not Detected Not Detected      Rapid Influenza A By PCR Not Detected Not Detected      Rapid Influenza B By PCR Not Detected Not Detected         Radiologic Studies  XR CHEST PORTABLE   Final Result      No acute process on portable chest.           ------------------------------------------------------------------------------------------------------------  The exam and treatment you received in the Emergency Department were for an urgent problem and are not intended as complete care. It is important that you follow-up with a doctor, nurse practitioner, or physician assistant to:  (1) confirm your diagnosis,  (2) re-evaluation of changes in your illness and treatment, and (3) for ongoing care. Please take your discharge instructions with you when you go to your follow-up appointment.     If you have any problem arranging a follow-up appointment, contact the Emergency Department.  If your symptoms become worse or you do not improve as expected and you are unable to reach your health care provider, please return to the Emergency Department. We are available 24 hours a day.     If a prescription has been provided, please have it filled as soon as possible to prevent a delay in treatment. If you have any questions or reservations about taking the medication due to side effects or interactions with other medications, please call your primary care

## 2024-04-23 NOTE — RT PROTOCOL NOTE
Patient states that she find out less than a year ago she has a left lung cyst and is suppose to follow up within the year.  MD notified.

## 2024-05-09 ENCOUNTER — HOSPITAL ENCOUNTER (EMERGENCY)
Facility: HOSPITAL | Age: 34
Discharge: HOME | End: 2024-05-09
Attending: EMERGENCY MEDICINE
Payer: MEDICAID

## 2024-05-09 ENCOUNTER — APPOINTMENT (OUTPATIENT)
Facility: HOSPITAL | Age: 34
End: 2024-05-09
Payer: MEDICAID

## 2024-05-09 VITALS
HEART RATE: 97 BPM | SYSTOLIC BLOOD PRESSURE: 100 MMHG | TEMPERATURE: 99 F | RESPIRATION RATE: 17 BRPM | DIASTOLIC BLOOD PRESSURE: 73 MMHG | OXYGEN SATURATION: 97 %

## 2024-05-09 DIAGNOSIS — J40 BRONCHITIS: Primary | ICD-10-CM

## 2024-05-09 PROCEDURE — 71045 X-RAY EXAM CHEST 1 VIEW: CPT

## 2024-05-09 PROCEDURE — 6370000000 HC RX 637 (ALT 250 FOR IP): Performed by: EMERGENCY MEDICINE

## 2024-05-09 PROCEDURE — 94640 AIRWAY INHALATION TREATMENT: CPT

## 2024-05-09 PROCEDURE — 99283 EMERGENCY DEPT VISIT LOW MDM: CPT

## 2024-05-09 RX ORDER — PREDNISONE 20 MG/1
20 TABLET ORAL DAILY
Qty: 4 TABLET | Refills: 0 | Status: SHIPPED | OUTPATIENT
Start: 2024-05-09 | End: 2024-05-13

## 2024-05-09 RX ORDER — CETIRIZINE HYDROCHLORIDE 10 MG/1
10 TABLET ORAL DAILY
Status: DISCONTINUED | OUTPATIENT
Start: 2024-05-09 | End: 2024-05-09 | Stop reason: HOSPADM

## 2024-05-09 RX ORDER — IPRATROPIUM BROMIDE AND ALBUTEROL SULFATE 2.5; .5 MG/3ML; MG/3ML
1 SOLUTION RESPIRATORY (INHALATION)
Status: COMPLETED | OUTPATIENT
Start: 2024-05-09 | End: 2024-05-09

## 2024-05-09 RX ORDER — DOXYCYCLINE HYCLATE 100 MG
100 TABLET ORAL 2 TIMES DAILY
Qty: 14 TABLET | Refills: 0 | Status: SHIPPED | OUTPATIENT
Start: 2024-05-09 | End: 2024-05-16

## 2024-05-09 RX ORDER — CETIRIZINE HYDROCHLORIDE 10 MG/1
10 TABLET ORAL DAILY
Qty: 7 TABLET | Refills: 0 | Status: SHIPPED | OUTPATIENT
Start: 2024-05-09 | End: 2024-05-16

## 2024-05-09 RX ORDER — DOXYCYCLINE 100 MG/1
100 CAPSULE ORAL
Status: COMPLETED | OUTPATIENT
Start: 2024-05-09 | End: 2024-05-09

## 2024-05-09 RX ORDER — PREDNISONE 20 MG/1
20 TABLET ORAL
Status: COMPLETED | OUTPATIENT
Start: 2024-05-09 | End: 2024-05-09

## 2024-05-09 RX ADMIN — CETIRIZINE HYDROCHLORIDE 10 MG: 10 TABLET, FILM COATED ORAL at 20:20

## 2024-05-09 RX ADMIN — DOXYCYCLINE 100 MG: 100 CAPSULE ORAL at 20:43

## 2024-05-09 RX ADMIN — IPRATROPIUM BROMIDE AND ALBUTEROL SULFATE 1 DOSE: 2.5; .5 SOLUTION RESPIRATORY (INHALATION) at 20:43

## 2024-05-09 RX ADMIN — PREDNISONE 20 MG: 20 TABLET ORAL at 20:20

## 2024-05-09 ASSESSMENT — ENCOUNTER SYMPTOMS
SHORTNESS OF BREATH: 1
GASTROINTESTINAL NEGATIVE: 1
COUGH: 1
EYES NEGATIVE: 1
CHEST TIGHTNESS: 0
ALLERGIC/IMMUNOLOGIC NEGATIVE: 1

## 2024-05-09 NOTE — ED TRIAGE NOTES
Pt arrived to ED via POV with cc of SOB.     Pt states she was Dx with seasonal allergies last week- Given steroids and inhaler. Pt states inhaler not working.     Pt presents with a stuffy nose and cough.

## 2024-05-10 NOTE — RT PROTOCOL NOTE
Breathing treatment administered by nurse. Patient assessed post treatment. Patient stated they are feeling better. On auscultation, fine crackles and scattered wheezes present. Saturating well.

## 2024-05-10 NOTE — DISCHARGE INSTRUCTIONS
[place] fluids.  Avoid irritants such as cats and pollen.  Wear face mask when outside or around cats.  Follow-up with allergist and primary care doctor tomorrow or return to the ED if symptoms get worse.

## 2024-05-10 NOTE — ED PROVIDER NOTES
[facility] EMERGENCY DEPT  EMERGENCY DEPARTMENT ENCOUNTER      Pt Name: Francesco Yo  MRN: [id number]  Birthdate 1990  Date of evaluation: 2024  Provider: Lisa Franco MD  8:03 PM    CHIEF COMPLAINT       Chief Complaint   Patient presents with    Shortness of Breath         HISTORY OF PRESENT ILLNESS    Francesco Yo is a 33 y.o. female who presents to the emergency department SOB.     HPI: sob with productive cough for a month.  She smokes cigarrettes daily.  She denies fevers and chills. Patient was seen in the ED and given breathing treatment and steroids. She got better but got worse. Where she lives it is cats in the house.  Hx of asthma.  Patient has used her inhaler numerous times today without relief.  Patient was seen by primary care doctor couple of days ago    Nursing Notes were reviewed.    REVIEW OF SYSTEMS       Review of Systems   Constitutional: Negative.  Negative for chills and fever.   HENT: Negative.     Eyes: Negative.    Respiratory:  Positive for cough and shortness of breath. Negative for chest tightness.    Cardiovascular: Negative.    Gastrointestinal: Negative.    Endocrine: Negative.    Genitourinary: Negative.    Musculoskeletal: Negative.    Skin: Negative.    Allergic/Immunologic: Negative.    Neurological: Negative.    Hematological: Negative.    Psychiatric/Behavioral: Negative.     All other systems reviewed and are negative.      Except as noted above the remainder of the review of systems was reviewed and negative.       PAST MEDICAL HISTORY     Past Medical History:   Diagnosis Date    Headache          SURGICAL HISTORY       Past Surgical History:   Procedure Laterality Date    GYN       section, tubal ligation         CURRENT MEDICATIONS       Previous Medications    ACETAMINOPHEN EXTRA STRENGTH 500 MG TABS    TAKE 2 TABS BY MOUTH EVERY 6 HOURS FOR 10 DAYS.    ALBUTEROL SULFATE HFA (VENTOLIN HFA) 108 (90 BASE) MCG/ACT INHALER    Inhale 2 puffs into the lungs 4 times

## (undated) DEVICE — LARGE, DISPOSABLE ALEXIS O C-SECTION PROTECTOR - RETRACTOR: Brand: ALEXIS ® O C-SECTION PROTECTOR - RETRACTOR

## (undated) DEVICE — SLIM BODY SKIN STAPLER: Brand: APPOSE ULC

## (undated) DEVICE — PENCIL ES L3M BTTN SWCH S STL HEX LOK BLDE ELECTRD HOLSTER

## (undated) DEVICE — INTENDED FOR TISSUE SEPARATION, AND OTHER PROCEDURES THAT REQUIRE A SHARP SURGICAL BLADE TO PUNCTURE OR CUT.: Brand: BARD-PARKER SAFETY BLADES SIZE 10, STERILE

## (undated) DEVICE — (D)STRIP SKN CLSR 0.5X4IN WHT --

## (undated) DEVICE — SUTURE PLN GUT SZ 3-0 L27IN ABSRB YELLOWISH TAN L40MM CT 852H

## (undated) DEVICE — 3M™ DURAPORE™ SURGICAL TAPE 2 INCHES X 10YARDS (5.0CM X 9.1M) 6ROLLS/CARTON 10CARTONS/CASE 1538-2: Brand: 3M™ DURAPORE™

## (undated) DEVICE — DRAPE TWL SURG 16X26IN BLU ORB04] ALLCARE INC]

## (undated) DEVICE — GAUZE SPONGES,USP TYPE VII GAUZE, 12 PLY: Brand: CURITY

## (undated) DEVICE — MEDI-VAC NON-CONDUCTIVE SUCTION TUBING: Brand: CARDINAL HEALTH

## (undated) DEVICE — FLEX ADVANTAGE 3000CC: Brand: FLEX ADVANTAGE

## (undated) DEVICE — TRAY PREP DRY W/ PREM GLV 2 APPL 6 SPNG 2 UNDPD 1 OVERWRAP

## (undated) DEVICE — 3M™ BAIR PAWS FLEX™ WARMING GOWN, STANDARD, 20 PER CASE 81003: Brand: BAIR PAWS™

## (undated) DEVICE — SOLUTION IV 1000ML 0.9% SOD CHL

## (undated) DEVICE — LIGHT HANDLE: Brand: DEVON

## (undated) DEVICE — Device

## (undated) DEVICE — HEX-LOCKING BLADE ELECTRODE: Brand: EDGE

## (undated) DEVICE — SOLUTION IRRIG 1000ML H2O STRL BLT

## (undated) DEVICE — STERILE POLYISOPRENE POWDER-FREE SURGICAL GLOVES: Brand: PROTEXIS

## (undated) DEVICE — BLADE ASSEMB CLP HAIR FINE --

## (undated) DEVICE — SUTURE VCRL SZ 2-0 L27IN ABSRB VLT L36MM CT-1 1/2 CIR J339H